# Patient Record
Sex: MALE | Race: WHITE | NOT HISPANIC OR LATINO | Employment: FULL TIME
[De-identification: names, ages, dates, MRNs, and addresses within clinical notes are randomized per-mention and may not be internally consistent; named-entity substitution may affect disease eponyms.]

---

## 2019-10-01 ENCOUNTER — HEALTH MAINTENANCE LETTER (OUTPATIENT)
Age: 34
End: 2019-10-01

## 2021-01-15 ENCOUNTER — HEALTH MAINTENANCE LETTER (OUTPATIENT)
Age: 36
End: 2021-01-15

## 2021-10-24 ENCOUNTER — HEALTH MAINTENANCE LETTER (OUTPATIENT)
Age: 36
End: 2021-10-24

## 2022-02-13 ENCOUNTER — HEALTH MAINTENANCE LETTER (OUTPATIENT)
Age: 37
End: 2022-02-13

## 2022-10-16 ENCOUNTER — HEALTH MAINTENANCE LETTER (OUTPATIENT)
Age: 37
End: 2022-10-16

## 2023-03-26 ENCOUNTER — HEALTH MAINTENANCE LETTER (OUTPATIENT)
Age: 38
End: 2023-03-26

## 2023-12-20 ENCOUNTER — OFFICE VISIT (OUTPATIENT)
Dept: INTERNAL MEDICINE | Facility: CLINIC | Age: 38
End: 2023-12-20
Payer: COMMERCIAL

## 2023-12-20 VITALS
SYSTOLIC BLOOD PRESSURE: 116 MMHG | OXYGEN SATURATION: 98 % | TEMPERATURE: 97.9 F | HEIGHT: 73 IN | DIASTOLIC BLOOD PRESSURE: 80 MMHG | BODY MASS INDEX: 35.39 KG/M2 | HEART RATE: 81 BPM | WEIGHT: 267 LBS | RESPIRATION RATE: 18 BRPM

## 2023-12-20 DIAGNOSIS — D68.51 HOMOZYGOUS FACTOR V LEIDEN MUTATION (H): ICD-10-CM

## 2023-12-20 DIAGNOSIS — Z13.220 SCREENING FOR HYPERLIPIDEMIA: ICD-10-CM

## 2023-12-20 DIAGNOSIS — I26.99 PE (PULMONARY THROMBOEMBOLISM) (H): ICD-10-CM

## 2023-12-20 DIAGNOSIS — B35.3 TINEA PEDIS OF RIGHT FOOT: ICD-10-CM

## 2023-12-20 DIAGNOSIS — Z11.59 NEED FOR HEPATITIS C SCREENING TEST: ICD-10-CM

## 2023-12-20 DIAGNOSIS — Z30.2 ENCOUNTER FOR VASECTOMY: ICD-10-CM

## 2023-12-20 DIAGNOSIS — Z13.29 SCREENING FOR THYROID DISORDER: ICD-10-CM

## 2023-12-20 DIAGNOSIS — Z79.01 WARFARIN ANTICOAGULATION: ICD-10-CM

## 2023-12-20 DIAGNOSIS — R07.9 CHEST PAIN, UNSPECIFIED TYPE: ICD-10-CM

## 2023-12-20 DIAGNOSIS — Z00.00 ANNUAL PHYSICAL EXAM: Primary | ICD-10-CM

## 2023-12-20 DIAGNOSIS — E66.812 CLASS 2 OBESITY WITH BODY MASS INDEX (BMI) OF 35.0 TO 35.9 IN ADULT, UNSPECIFIED OBESITY TYPE, UNSPECIFIED WHETHER SERIOUS COMORBIDITY PRESENT: ICD-10-CM

## 2023-12-20 LAB
BASOPHILS # BLD AUTO: 0 10E3/UL (ref 0–0.2)
BASOPHILS NFR BLD AUTO: 0 %
EOSINOPHIL # BLD AUTO: 0.1 10E3/UL (ref 0–0.7)
EOSINOPHIL NFR BLD AUTO: 3 %
ERYTHROCYTE [DISTWIDTH] IN BLOOD BY AUTOMATED COUNT: 12.9 % (ref 10–15)
HCT VFR BLD AUTO: 43 % (ref 40–53)
HGB BLD-MCNC: 14.9 G/DL (ref 13.3–17.7)
IMM GRANULOCYTES # BLD: 0 10E3/UL
IMM GRANULOCYTES NFR BLD: 0 %
INR PPP: 1.79 (ref 0.85–1.15)
LYMPHOCYTES # BLD AUTO: 1.7 10E3/UL (ref 0.8–5.3)
LYMPHOCYTES NFR BLD AUTO: 36 %
MCH RBC QN AUTO: 28 PG (ref 26.5–33)
MCHC RBC AUTO-ENTMCNC: 34.7 G/DL (ref 31.5–36.5)
MCV RBC AUTO: 81 FL (ref 78–100)
MONOCYTES # BLD AUTO: 0.4 10E3/UL (ref 0–1.3)
MONOCYTES NFR BLD AUTO: 9 %
NEUTROPHILS # BLD AUTO: 2.4 10E3/UL (ref 1.6–8.3)
NEUTROPHILS NFR BLD AUTO: 51 %
PLATELET # BLD AUTO: 173 10E3/UL (ref 150–450)
RBC # BLD AUTO: 5.32 10E6/UL (ref 4.4–5.9)
WBC # BLD AUTO: 4.6 10E3/UL (ref 4–11)

## 2023-12-20 PROCEDURE — 85610 PROTHROMBIN TIME: CPT | Performed by: INTERNAL MEDICINE

## 2023-12-20 PROCEDURE — 84443 ASSAY THYROID STIM HORMONE: CPT | Performed by: INTERNAL MEDICINE

## 2023-12-20 PROCEDURE — 85025 COMPLETE CBC W/AUTO DIFF WBC: CPT | Performed by: INTERNAL MEDICINE

## 2023-12-20 PROCEDURE — 93000 ELECTROCARDIOGRAM COMPLETE: CPT | Performed by: INTERNAL MEDICINE

## 2023-12-20 PROCEDURE — 86803 HEPATITIS C AB TEST: CPT | Performed by: INTERNAL MEDICINE

## 2023-12-20 PROCEDURE — 99385 PREV VISIT NEW AGE 18-39: CPT | Performed by: INTERNAL MEDICINE

## 2023-12-20 PROCEDURE — 36415 COLL VENOUS BLD VENIPUNCTURE: CPT | Performed by: INTERNAL MEDICINE

## 2023-12-20 PROCEDURE — 80061 LIPID PANEL: CPT | Performed by: INTERNAL MEDICINE

## 2023-12-20 PROCEDURE — 99214 OFFICE O/P EST MOD 30 MIN: CPT | Mod: 25 | Performed by: INTERNAL MEDICINE

## 2023-12-20 PROCEDURE — 80053 COMPREHEN METABOLIC PANEL: CPT | Performed by: INTERNAL MEDICINE

## 2023-12-20 RX ORDER — CLOTRIMAZOLE 1 %
CREAM (GRAM) TOPICAL 2 TIMES DAILY
Qty: 30 G | Refills: 1 | Status: SHIPPED | OUTPATIENT
Start: 2023-12-20

## 2023-12-20 RX ORDER — DEXTROAMPHETAMINE SACCHARATE, AMPHETAMINE ASPARTATE MONOHYDRATE, DEXTROAMPHETAMINE SULFATE AND AMPHETAMINE SULFATE 7.5; 7.5; 7.5; 7.5 MG/1; MG/1; MG/1; MG/1
CAPSULE, EXTENDED RELEASE ORAL
COMMUNITY
Start: 2023-11-03

## 2023-12-20 ASSESSMENT — ENCOUNTER SYMPTOMS
ABDOMINAL PAIN: 0
DIARRHEA: 0
DYSURIA: 0
FREQUENCY: 0
HEADACHES: 0
NERVOUS/ANXIOUS: 0
DIZZINESS: 0
EYE PAIN: 0
HEMATOCHEZIA: 0
JOINT SWELLING: 0
HEMATURIA: 0
COUGH: 0
HEARTBURN: 0
CHILLS: 0
WEAKNESS: 0
CONSTIPATION: 0
PARESTHESIAS: 0
SHORTNESS OF BREATH: 0
FEVER: 0
SORE THROAT: 0
ARTHRALGIAS: 0
MYALGIAS: 0
NAUSEA: 0
PALPITATIONS: 0

## 2023-12-20 NOTE — PROGRESS NOTES
SUBJECTIVE:   Rajesh is a 38 year old, presenting for the following:  Physical        Patient is a 38-year-old  male who presents to the clinic as a new patient for his annual physical examination.  He does have a history of ADHD as well as factor V Leiden mutation.  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  He is currently on chronic anticoagulation.  Patient does take warfarin 2.5 mg every day of the week, but he does state that every 2 weeks he will take 3 days of 5 mg.  His last INR was in August at an outside clinic.  Patient states that his goal INR has been 2-3.  Patient does also have concerns about intermittent chest pains that he has been having for the past several years.  He states that they will occur randomly and last for a few minutes.  He describes them as a pressure-like sensation.  Patient does not report any diaphoresis, palpitations, or difficulties breathing these episodes do occur.  He does report a family history of coronary disease as his mother did have a MI in her early 50s.  Patient would like further evaluation of this issue at this time.  He is also requesting referral for vasectomy.  Patient is concerned about his weight as well.  He does state that he has 2 small children at home, and he has not been able to be as active as he has been in the past.  Patient is interested in meeting with the [place] weight management program.  Patient is fasting for lab work today.  His only other concerns in regards to a persistent rash located between his third, fourth, and fifth toes on his right foot.  Patient reports there is always an area of redness that is wet with peeling skin.  This area will itch frequently as well.  He is wondering what he can do for this issue at this time.    Healthy Habits:     Getting at least 3 servings of Calcium per day:  Yes    Bi-annual eye exam:  NO    Dental care twice a year:  Yes    Sleep apnea or symptoms of sleep apnea:  None    " Diet:  Regular (no restrictions)    Frequency of exercise:  None    Taking medications regularly:  Yes    Medication side effects:  None    Additional concerns today:  Yes      Today's PHQ-2 Score:       12/20/2023     7:57 AM   PHQ-2 ( 1999 [employer])   Q1: Little interest or pleasure in doing things 0   Q2: Feeling down, depressed or hopeless 0   PHQ-2 Score 0   Q1: Little interest or pleasure in doing things Not at all   Q2: Feeling down, depressed or hopeless Not at all   PHQ-2 Score 0       Have you ever done Advance Care Planning? (For example, a Health Directive, POLST, or a discussion with a medical provider or your loved ones about your wishes): No, advance care planning information given to patient to review.  Patient declined advance care planning discussion at this time.    Social History     Tobacco Use    Smoking status: Never    Smokeless tobacco: Never   Substance Use Topics    Alcohol use: Yes     Comment: occ           12/20/2023     7:57 AM   Alcohol Use   Prescreen: >3 drinks/day or >7 drinks/week? No       Last PSA: No results found for: \"PSA\"    Reviewed orders with patient. Reviewed health maintenance and updated orders accordingly - Yes  Lab work is in process    Reviewed and updated as needed this visit by clinical staff   Tobacco  Allergies  Meds  Problems  Med Hx  Surg Hx  Fam Hx          Reviewed and updated as needed this visit by Provider   Tobacco  Allergies  Meds  Problems  Med Hx  Surg Hx  Fam Hx           Review of Systems   Constitutional:  Negative for chills and fever.   HENT:  Negative for congestion, ear pain, hearing loss and sore throat.    Eyes:  Negative for pain and visual disturbance.   Respiratory:  Negative for cough and shortness of breath.    Cardiovascular:  Positive for chest pain. Negative for palpitations and peripheral edema.   Gastrointestinal:  Negative for abdominal pain, constipation, diarrhea, heartburn, hematochezia and nausea.   Genitourinary:  " "Negative for dysuria, frequency, genital sores, hematuria, impotence, penile discharge and urgency.   Musculoskeletal:  Negative for arthralgias, joint swelling and myalgias.   Skin:  Positive for rash.   Neurological:  Negative for dizziness, weakness, headaches and paresthesias.   Psychiatric/Behavioral:  Negative for mood changes. The patient is not nervous/anxious.        OBJECTIVE:   /80   Pulse 81   Temp 97.9  F (36.6  C)   Resp 18   Ht 1.854 m (6' 1\")   Wt 121.1 kg (267 lb)   SpO2 98%   BMI 35.23 kg/m      Physical Exam  Vitals reviewed.   HENT:      Head: Normocephalic and atraumatic.      Right Ear: Tympanic membrane, ear canal and external ear normal.      Left Ear: Tympanic membrane, ear canal and external ear normal.      Mouth/Throat:      Mouth: Mucous membranes are moist.      Pharynx: Oropharynx is clear.   Eyes:      Extraocular Movements: Extraocular movements intact.      Conjunctiva/sclera: Conjunctivae normal.      Pupils: Pupils are equal, round, and reactive to light.   Cardiovascular:      Rate and Rhythm: Normal rate and regular rhythm.      Pulses: Normal pulses.      Heart sounds: Normal heart sounds.   Pulmonary:      Effort: Pulmonary effort is normal.      Breath sounds: Normal breath sounds.   Abdominal:      General: Bowel sounds are normal.      Palpations: Abdomen is soft.   Musculoskeletal:         General: Normal range of motion.      Cervical back: Normal range of motion and neck supple.   Skin:     General: Skin is warm and dry.      Capillary Refill: Capillary refill takes less than 2 seconds.      Comments: Area of erythematous, peeling skin noted in the intertrigo in the spaces of his third, fourth, and fifth digits on his right foot.   Neurological:      General: No focal deficit present.      Mental Status: He is alert and oriented to person, place, and time.     Diagnostic testing: CMP, CBC, FLP, TSH, and hepatitis C screening are pending.  EKG is " pending.    ASSESSMENT/PLAN:   (Z00.00) Annual physical exam  (primary encounter diagnosis)  Comment: At this time, patient does have a relatively unremarkable physical examination.  His blood pressure was noted to be at an acceptable level.  We did visit discussing appropriate dietary lifestyle modifications to help keep his weight and blood pressure under better control.  Fasting labs are pending.  All health maintenance items were addressed.    (Z11.59) Need for hepatitis C screening test  Comment: Hepatitis C Screen Reflex to HCV RNA Quant and         Genotype    (I26.99) PE (pulmonary thromboembolism) (H), (D68.51) Homozygous Factor V Leiden mutation (H24), and (Z79.01) Warfarin anticoagulation  Comment: At this time, a referral to the [facility] clinic will be placed for continued management of his anticoagulation given his history of homozygous factor V Leiden mutation and recurrent clots.  He does have a follow-up INR pending.  We did briefly discuss the possibility of transitioning to an alternative anticoagulant, such as Xarelto or Eliquis.  Patient states that he is interested in those medications, but he would like to discuss them with his insurance company first.    (Z13.220) Screening for hyperlipidemia  Comment: Lipid panel reflex to direct LDL Fasting    (Z13.29) Screening for thyroid disorder  Comment: TSH with free T4 reflex    (B35.3) Tinea pedis of right foot  Comment: Patient appears to have a case of tinea pedis involving the toes on his right foot.  I will submit a prescription for clotrimazole cream to be applied to the affected area twice per day.  Patient was encouraged to try to keep the areas clean and dry as possible.    (Z30.2) Encounter for vasectomy  Comment: Adult Urology  Referral    (R07.9) Chest pain, unspecified type  Comment: At this time, we did elect to proceed with further evaluation of his intermittent chest pain that has been present for the past several years.  An EKG is  currently pending.  He also has a CBC to evaluate for any anemia that could be contribute to his symptoms.  Patient also has a CMP and FLP that are pending.  It may be worthwhile to consider stress testing if his labs and EKG are unremarkable given his family history of coronary artery disease.    (E66.9,  Z68.35) Class 2 obesity with body mass index (BMI) of 35.0 to 35.9 in adult, unspecified obesity type, unspecified whether serious comorbidity present  Comment: Patient was noted to have a BMI of 35.2.  We did discuss weight management options, and patient was ultimately agreeable to proceed with the [place] weight management program.  A referral was placed for him today.    Patient has been advised of split billing requirements and indicates understanding: Yes      COUNSELING:   Reviewed preventive health counseling, as reflected in patient instructions       Regular exercise       Healthy diet/nutrition        He reports that he has never smoked. He has never used smokeless tobacco.      Ash Diaz MD  [facility]

## 2023-12-21 ENCOUNTER — ANTICOAGULATION THERAPY VISIT (OUTPATIENT)
Dept: ANTICOAGULATION | Facility: CLINIC | Age: 38
End: 2023-12-21
Payer: COMMERCIAL

## 2023-12-21 ENCOUNTER — TELEPHONE (OUTPATIENT)
Dept: ANTICOAGULATION | Facility: CLINIC | Age: 38
End: 2023-12-21
Payer: COMMERCIAL

## 2023-12-21 ENCOUNTER — TELEPHONE (OUTPATIENT)
Dept: INTERNAL MEDICINE | Facility: CLINIC | Age: 38
End: 2023-12-21
Payer: COMMERCIAL

## 2023-12-21 DIAGNOSIS — D68.51 HOMOZYGOUS FACTOR V LEIDEN MUTATION (H): Primary | ICD-10-CM

## 2023-12-21 DIAGNOSIS — I26.99 PE (PULMONARY THROMBOEMBOLISM) (H): ICD-10-CM

## 2023-12-21 DIAGNOSIS — I82.401 DEEP VEIN THROMBOSIS (DVT) OF RIGHT LOWER EXTREMITY, UNSPECIFIED CHRONICITY, UNSPECIFIED VEIN (H): ICD-10-CM

## 2023-12-21 DIAGNOSIS — Z79.01 WARFARIN ANTICOAGULATION: ICD-10-CM

## 2023-12-21 LAB
ALBUMIN SERPL BCG-MCNC: 4.2 G/DL (ref 3.5–5.2)
ALP SERPL-CCNC: 78 U/L (ref 40–150)
ALT SERPL W P-5'-P-CCNC: 38 U/L (ref 0–70)
ANION GAP SERPL CALCULATED.3IONS-SCNC: 8 MMOL/L (ref 7–15)
AST SERPL W P-5'-P-CCNC: 30 U/L (ref 0–45)
BILIRUB SERPL-MCNC: 0.4 MG/DL
BUN SERPL-MCNC: 14.1 MG/DL (ref 6–20)
CALCIUM SERPL-MCNC: 9.2 MG/DL (ref 8.6–10)
CHLORIDE SERPL-SCNC: 104 MMOL/L (ref 98–107)
CHOLEST SERPL-MCNC: 158 MG/DL
CREAT SERPL-MCNC: 1.11 MG/DL (ref 0.67–1.17)
DEPRECATED HCO3 PLAS-SCNC: 28 MMOL/L (ref 22–29)
EGFRCR SERPLBLD CKD-EPI 2021: 88 ML/MIN/1.73M2
FASTING STATUS PATIENT QL REPORTED: YES
GLUCOSE SERPL-MCNC: 84 MG/DL (ref 70–99)
HCV AB SERPL QL IA: NONREACTIVE
HDLC SERPL-MCNC: 47 MG/DL
LDLC SERPL CALC-MCNC: 100 MG/DL
NONHDLC SERPL-MCNC: 111 MG/DL
POTASSIUM SERPL-SCNC: 4.6 MMOL/L (ref 3.4–5.3)
PROT SERPL-MCNC: 7.2 G/DL (ref 6.4–8.3)
SODIUM SERPL-SCNC: 140 MMOL/L (ref 135–145)
TRIGL SERPL-MCNC: 53 MG/DL
TSH SERPL DL<=0.005 MIU/L-ACNC: 2.1 UIU/ML (ref 0.3–4.2)

## 2023-12-21 RX ORDER — WARFARIN SODIUM 2.5 MG/1
TABLET ORAL
Qty: 108 TABLET | Refills: 0 | Status: SHIPPED | OUTPATIENT
Start: 2023-12-21

## 2023-12-21 NOTE — TELEPHONE ENCOUNTER
"ANTICOAGULATION  MANAGEMENT: NEW REFERRAL      SUBJECTIVE/OBJECTIVE     Rajesh Ordaz, a 38 year old male  is newly referred to [facility].    Anticoagulation:    Previously on warfarin: Yes,  has been on for about 10 years.  Approximate previous dose: Take 2.5 mg daily and on every fifth day take 5 mg or as directed  Warfarin initiation date (approximate): about 10 years   Indication(s): DVT, PE, and Homozygous Factor V Leiden   Goal Range: 2.0-3.0   Anticoagulation Bridge/Overlap: No   Referring provider: from PCP    General Dietary/Social Hx:    Typical vitamin K intake: low to moderate variable     Other dietary considerations: None     Social History:   Social History     Tobacco Use    Smoking status: Never    Smokeless tobacco: Never   Substance Use Topics    Alcohol use: Yes     Comment: occ    Drug use: No     Patient reports alcohol use varies, does not have very often,   In the past 2 weeks, patient estimates taking medications as instructed % of time: 100    Results:        Recent labs: (last 7 days)     12/20/23  0913   INR 1.79*       Wt Readings from Last 2 Encounters:   12/20/23 121.1 kg (267 lb)   01/15/14 119.3 kg (263 lb 0.1 oz)      Estimated body mass index is 35.23 kg/m  as calculated from the following:    Height as of 12/20/23: 1.854 m (6' 1\").    Weight as of 12/20/23: 121.1 kg (267 lb).  Lab Results   Component Value Date    AST 30 12/20/2023    ALT 38 12/20/2023    ALBUMIN 4.2 12/20/2023     Lab Results   Component Value Date    CR 1.11 12/20/2023     Estimated Creatinine Clearance: 125.4 mL/min (based on SCr of 1.11 mg/dL).    ASSESSMENT     Goal INR 2-3, standard for indication(s) above  On warfarin > 30 days; maintenance dose has been established  Maintenance dose established prior to referral    PLAN     Dosing Instructions:  Take 2.5 mg daily and on every fifth day take 5 mg or as directed was previous dosing, New Rx for 2.5 mg tablets will be sent to " pharmacy so patient will have a more consistent dosing of 3.75 mg every Mon, Wed, Fri; 2.5 mg all other days  with INR in 1 week, however patient requested 9 days           Education provided:   Please call back if any changes to your diet, medications or how you've been taking warfarin  Dietary considerations: importance of consistent vitamin K intake  Symptom monitoring: monitoring for bleeding signs and symptoms, monitoring for clotting signs and symptoms, and if you hit your head or have a bad fall seek emergency care  Importance of notifying anticoagulation clinic for: changes in medications; a sooner lab recheck maybe needed, diarrhea, nausea/vomiting, reduced intake, cold/flu, and/or infections; a sooner lab recheck maybe needed, and upcoming surgeries and procedures 2 weeks in advance  Contact [phone]  with any changes, questions or concerns.   New patient packet placed in outgoing mail so patient has our information    Education still needed:   Please call back if any changes to your diet, medications or how you've been taking warfarin  Contact [phone]  with any changes, questions or concerns.       Telephone call with Rajesh who verbalizes understanding and agrees to plan    Lab visit scheduled    Standing orders placed in Epic: Point of Care INR (Lab 5000)    Plan made per [facility] anticoagulation protocol    Yvonne Ramires RN  Anticoagulation Clinic  12/21/2023

## 2023-12-21 NOTE — PROGRESS NOTES
ANTICOAGULATION MANAGEMENT     Rajesh Ordaz 38 year old male is on warfarin with subtherapeutic INR result. (Goal INR 2.0-3.0)    Recent labs: (last 7 days)     12/20/23  0913   INR 1.79*       ASSESSMENT     Source(s): Chart Review and Patient/Caregiver Call     Warfarin doses taken: Warfarin taken as instructed Previous warfarin dosing was 2.5 mg daily and on every fifth day take 5 mg Will send in a new Rx for 2.5 mg tablets so patient will have a more consistent dosing of 3.75 mg every Mon, Wed, Fri; 2.5 mg all other days  Diet: No new diet changes identified, working on consistency in green veggies  Medication/supplement changes: None noted  New illness, injury, or hospitalization: No  Signs or symptoms of bleeding or clotting: No  Previous result:  First INR with [facility] 12/20/23  Additional findings: Refill needed today. Rajesh meets all criteria for refill (current [facility] referral, office visit with referring provider/group in last 1 year unless directed to return in 2 years in last referring provider visit note, lab monitoring up to date or not exceeding 2 weeks overdue). Rx instructions and quantity supplied updated to match patient's current dosing plan.  90 day supply with 0 refills granted per [facility] protocol        PLAN     Recommended plan for no diet, medication or health factor changes affecting INR     Dosing Instructions: booster dose then continue your current warfarin dose as discussed today with next INR in 9 days per patient request       Summary  As of 12/21/2023      Full warfarin instructions:  3.75 mg every Mon, Wed, Fri; 2.5 mg all other days   Next INR check:  12/29/2023               Telephone call with Rajesh who verbalizes understanding and agrees to plan    Lab visit scheduled    Education provided:   Please call back if any changes to your diet, medications or how you've been taking warfarin  Contact [phone]  with any changes, questions or concerns.     Plan made per [facility]  anticoagulation protocol    Yvonne Ramires RN  Anticoagulation Clinic  12/21/2023    _______________________________________________________________________     Anticoagulation Episode Summary       Current INR goal:  2.0-3.0   TTR:  --   Target end date:  Indefinite   Send INR reminders to:  [facility]    Indications    PE (pulmonary thromboembolism) (H) [I26.99]  Homozygous Factor V Leiden mutation (H24) [D68.51]  Warfarin anticoagulation [Z79.01]             Comments:               Anticoagulation Care Providers       Provider Role Specialty Phone number    Ash Diaz MD Referring Internal Medicine [phone]

## 2023-12-21 NOTE — TELEPHONE ENCOUNTER
[employer] screening form completed and faxed to [phone] and original mailed to patient . Copy in chart

## 2023-12-29 ENCOUNTER — LAB (OUTPATIENT)
Dept: LAB | Facility: CLINIC | Age: 38
End: 2023-12-29
Payer: COMMERCIAL

## 2023-12-29 ENCOUNTER — ANTICOAGULATION THERAPY VISIT (OUTPATIENT)
Dept: ANTICOAGULATION | Facility: CLINIC | Age: 38
End: 2023-12-29

## 2023-12-29 DIAGNOSIS — I82.401 DEEP VEIN THROMBOSIS (DVT) OF RIGHT LOWER EXTREMITY, UNSPECIFIED CHRONICITY, UNSPECIFIED VEIN (H): ICD-10-CM

## 2023-12-29 DIAGNOSIS — I26.99 PE (PULMONARY THROMBOEMBOLISM) (H): Primary | ICD-10-CM

## 2023-12-29 DIAGNOSIS — I26.99 PE (PULMONARY THROMBOEMBOLISM) (H): ICD-10-CM

## 2023-12-29 DIAGNOSIS — D68.51 HOMOZYGOUS FACTOR V LEIDEN MUTATION (H): ICD-10-CM

## 2023-12-29 DIAGNOSIS — Z79.01 WARFARIN ANTICOAGULATION: ICD-10-CM

## 2023-12-29 LAB — INR BLD: 2.9 (ref 0.9–1.1)

## 2023-12-29 PROCEDURE — 85610 PROTHROMBIN TIME: CPT

## 2023-12-29 PROCEDURE — 36416 COLLJ CAPILLARY BLOOD SPEC: CPT

## 2023-12-29 NOTE — PROGRESS NOTES
ANTICOAGULATION MANAGEMENT     Rajesh Ordaz 38 year old male is on warfarin with therapeutic INR result. (Goal INR 2.0-3.0)    Recent labs: (last 7 days)     12/29/23  1140   INR 2.9*       ASSESSMENT     Warfarin Lab Questionnaire    Warfarin Doses Last 7 Days      12/29/2023    11:35 AM   Dose in Tablet or Mg   TAB or MG? tablet (tab)     Pt Rptd Dose SUNDAY MONDAY TUESDAY WED THURS FRIDAY SATURDAY 12/29/2023  11:35 AM 1 1.5 1 1.5 1 1.5 1         12/29/2023   Warfarin Lab Questionnaire   Missed doses within past 14 days? Yes   If yes; please list when: Missed pills on 12/24 & 12/25 but took Tues morning   Changes in diet or alcohol within past 14 days? No   Medication changes since last result? No   Injuries or illness since last result? No   New shortness of breath, severe headaches or sudden changes in vision since last result? No   Abnormal bleeding since last result? No   Upcoming surgery, procedure? No   Best number to call with results? [phone]     Previous result: Subtherapeutic  Additional findings: None       PLAN     Recommended plan for temporary change(s) affecting INR     Dosing Instructions: Continue your current warfarin dose with next INR in 2 weeks       Summary  As of 12/29/2023      Full warfarin instructions:  3.75 mg every Mon, Wed, Fri; 2.5 mg all other days   Next INR check:  1/12/2024               Telephone call with Rajesh who verbalizes understanding and agrees to plan and who agrees to plan and repeated back plan correctly    Lab visit scheduled    Education provided:   Informed patient that he made up for the missed warfarin doses appropriately.    Plan made per [facility] anticoagulation protocol    Hermila Gore, RN  Anticoagulation Clinic  12/29/2023    _______________________________________________________________________     Anticoagulation Episode Summary       Current INR goal:  2.0-3.0   TTR:  --   Target end date:  Indefinite   Send INR reminders to:  [facility]ON[facility]LS[facility]  [facility]    Indications    PE (pulmonary thromboembolism) (H) [I26.99]  Homozygous Factor V Leiden mutation (H24) [D68.51]  Warfarin anticoagulation [Z79.01]             Comments:               Anticoagulation Care Providers       Provider Role Specialty Phone number    Ash Diaz MD Referring Internal Medicine [phone]

## 2024-01-12 ENCOUNTER — ANTICOAGULATION THERAPY VISIT (OUTPATIENT)
Dept: ANTICOAGULATION | Facility: CLINIC | Age: 39
End: 2024-01-12

## 2024-01-12 ENCOUNTER — LAB (OUTPATIENT)
Dept: LAB | Facility: CLINIC | Age: 39
End: 2024-01-12
Payer: COMMERCIAL

## 2024-01-12 DIAGNOSIS — D68.51 HOMOZYGOUS FACTOR V LEIDEN MUTATION (H): ICD-10-CM

## 2024-01-12 DIAGNOSIS — I82.401 DEEP VEIN THROMBOSIS (DVT) OF RIGHT LOWER EXTREMITY, UNSPECIFIED CHRONICITY, UNSPECIFIED VEIN (H): ICD-10-CM

## 2024-01-12 DIAGNOSIS — I26.99 PE (PULMONARY THROMBOEMBOLISM) (H): Primary | ICD-10-CM

## 2024-01-12 DIAGNOSIS — Z79.01 WARFARIN ANTICOAGULATION: ICD-10-CM

## 2024-01-12 DIAGNOSIS — I26.99 PE (PULMONARY THROMBOEMBOLISM) (H): ICD-10-CM

## 2024-01-12 LAB — INR BLD: 2.3 (ref 0.9–1.1)

## 2024-01-12 PROCEDURE — 85610 PROTHROMBIN TIME: CPT

## 2024-01-12 PROCEDURE — 36416 COLLJ CAPILLARY BLOOD SPEC: CPT

## 2024-01-12 NOTE — PROGRESS NOTES
ANTICOAGULATION MANAGEMENT     Rajesh Ordaz 38 year old male is on warfarin with therapeutic INR result. (Goal INR 2.0-3.0)    Recent labs: (last 7 days)     01/12/24  1145   INR 2.3*       ASSESSMENT     Warfarin Lab Questionnaire    Warfarin Doses Last 7 Days      1/12/2024    11:41 AM   Dose in Tablet or Mg   TAB or MG? tablet (tab)     Pt Rptd Dose SUNDAY MONDAY TUESDAY WED THURS FRIDAY SATURDAY 1/12/2024  11:41 AM 1 1.5 1 1.5 1 1.5 1         1/12/2024   Warfarin Lab Questionnaire   Missed doses within past 14 days? Yes   If yes; please list when: took tues dose weds morning   Changes in diet or alcohol within past 14 days? No, patient reports he is not consistent with vitamin K intake but plans to improve this. He reports 3-4 servings in the past week (about 3 salads and broccoli last night).   Medication changes since last result? No   Injuries or illness since last result? No   New shortness of breath, severe headaches or sudden changes in vision since last result? No   Abnormal bleeding since last result? No   Upcoming surgery, procedure? No   Best number to call with results? [phone]     Previous result: Therapeutic last visit; previously outside of goal range  Additional findings: patient reports he is still using his 5mg tablets on his 2.5mg days, bottle is dated 08/2023, calendar updated.       PLAN     Recommended plan for temporary change(s) affecting INR     Dosing Instructions: Continue your current warfarin dose with next INR in 3 weeks       Summary  As of 1/12/2024      Full warfarin instructions:  3.75 mg every Mon, Wed, Fri; 2.5 mg all other days   Next INR check:  2/2/2024               Telephone call with Rajesh who verbalizes understanding and agrees to plan and who agrees to plan and repeated back plan correctly    Lab visit scheduled    Education provided:   Taking warfarin: prescribed tablet strength and color and Importance of taking warfarin as instructed  Dietary  considerations: importance of consistent vitamin K intake, impact of vitamin K foods on INR, and vitamin K content of foods  Vitamin K food list sent to My Chart per patient request.    Plan made per [facility] anticoagulation protocol    Hermila Gore RN  Anticoagulation Clinic  1/12/2024    _______________________________________________________________________     Anticoagulation Episode Summary       Current INR goal:  2.0-3.0   TTR:  100.0% (1.9 wk)   Target end date:  Indefinite   Send INR reminders to:  [facility]    Indications    PE (pulmonary thromboembolism) (H) [I26.99]  Homozygous Factor V Leiden mutation (H24) [D68.51]  Warfarin anticoagulation [Z79.01]             Comments:               Anticoagulation Care Providers       Provider Role Specialty Phone number    Ash Diaz MD Referring Internal Medicine [phone]

## 2024-01-19 ENCOUNTER — MYC MEDICAL ADVICE (OUTPATIENT)
Dept: UROLOGY | Facility: CLINIC | Age: 39
End: 2024-01-19
Payer: COMMERCIAL

## 2024-01-24 ENCOUNTER — VIRTUAL VISIT (OUTPATIENT)
Dept: SURGERY | Facility: CLINIC | Age: 39
End: 2024-01-24
Payer: COMMERCIAL

## 2024-01-24 DIAGNOSIS — Z71.3 NUTRITIONAL COUNSELING: Primary | ICD-10-CM

## 2024-01-24 DIAGNOSIS — E66.812 CLASS 2 OBESITY WITH BODY MASS INDEX (BMI) OF 35.0 TO 35.9 IN ADULT, UNSPECIFIED OBESITY TYPE, UNSPECIFIED WHETHER SERIOUS COMORBIDITY PRESENT: ICD-10-CM

## 2024-01-24 PROCEDURE — 97802 MEDICAL NUTRITION INDIV IN: CPT | Mod: 95 | Performed by: DIETITIAN, REGISTERED

## 2024-01-24 NOTE — PROGRESS NOTES
"Rajesh Ordaz is a 38 year old who is being evaluated via a billable video visit.      How would you like to obtain your AVS? MyChart  If the video visit is dropped, the invitation should be resent by: Send to e-mail at: [email or web address]  Will anyone else be joining your video visit? No          Medical Weight Loss Initial Diet Evaluation  Assessment:  Rajesh is presenting today for a new weight management nutrition consultation.     Weight loss medication:  None .     Anthropometrics:    Pt's weight is 265 lbs   Initial weight: 265 lbs   Weight change: 0  BMI: There is no height or weight on file to calculate BMI.   Ideal body weight: 79.9 kg (176 lb 2.4 oz)  Adjusted ideal body weight: 96.4 kg (212 lb 7.8 oz)    Estimated RMR (Vilas-St Jeor equation):  2189 kcals x 1.3 (light active) = 2846 kcals (for weight maintenance)    Recommended Protein Intake: 80-10 grams of protein/day    Medical History:  Patient Active Problem List   Diagnosis    Allergic rhinitis due to other allergen    PE (pulmonary thromboembolism) (H)    DVT (deep venous thrombosis) (H)    Hyperglycemia    Obesity    Infarct of lung (H)    Iron deficiency anemia    Homozygous Factor V Leiden mutation (H24)    Warfarin anticoagulation      Diabetes: No   HbA1c:  No results found for: \"HGBA1C\"    Nutrition History:   Food allergies/intolerances/cultural or religous food customs: No     Vitamins/Mineral Supplementation: Nothing Currently     Dietary Recall:  Breakfast: bowl of cereal with milk or Nicola Bar or Sausage and Egg Biscuit or overnight oats  Lunch: occasional may eat out or turkey BBQ sauce and cheddar sandwich, apple, chips or instant Turkish Food (from a packet) or skipped   Dinner: take out or Beef Stew with 1 slice of toast or tacos or sloppy joes with a salad or vegetable or leftovers from a previous meal or egg, cheese, and sausage sandwich   Typical Snacks: needs to improve upon, way too much snacking this time of the " year-cookies, candy, cashews or pistachios, popcorn or chips, crackers with PB   Overnight eating: No  Eating out: 2-3 times/week (sometimes more)     Beverages: milk 1-2 times/day, water, occasional pop or mixed drink (a few per week)     Exercise: no set regimen    Nutrition Diagnosis (PES statement):     Obesity related to excessive energy intake as evidence by subjective statements and BMI of 35 kg/m2.        Nutrition Intervention  Food and/or Nutrient Delivery   Placed emphasis on importance of developing a healthy meal routine, aiming for 3 meals a day and no snacks.    Nutrition Education   Discussed with patient how to build a meal: the importance of including a lean/low fat protein at each meal, include a source of vegetables at a minimum of lunch and dinner and limiting carbohydrate intake to <25% per meal.  Educated on sources of lean protein, portion sizes, the amount of grams found in each source. Recommend patient to aim for 20-30g protein at each meal.  Educated on how to read a food label: keeping total fat <10g and sugar <10g per serving.  Discussed the importance of adequate hydration, with emphasis on drinking 64oz of water or zero calorie beverages per day.    Nutrition Counseling   Encouraged importance of developing routine exercise for health benefits and weight loss.      Goals established by patient:   Focus on protein first at each meal, aiming for 20-30 grams of protein/meal, 3 meals/day.   Work on meal planning/prepping ahead of time to help reduce eating out frequency. Use list in [employer] to help with suggestions for meals and snacks.     Handouts provided:  Calorie Controlled Meals  Quick and Easy Meals   Snack Ideas    Assessment/Plan:    Pt will follow up in prn with dietitian.     Video-Visit Details    Type of service:  Video Visit    Video Start Time (time video started): 1:20 PM    Video End Time (time video stopped): 2:07 PM    Originating Location (pt. Location):  Home      Distant Location (provider location):  Off-site    Mode of Communication:  Video Conference via [facility]    Physician has received verbal consent for a Video Visit from the patient? Yes      Kayleen Borrego, RD

## 2024-01-24 NOTE — LETTER
"    1/24/2024         RE: Rajesh Ordaz  [address] [facility] [address]        Dear Colleague,    Thank you for referring your patient, Rajesh Ordaz, to the [facility]. Please see a copy of my visit note below.    Rajesh Ordaz is a 38 year old who is being evaluated via a billable video visit.      How would you like to obtain your AVS? MyChart  If the video visit is dropped, the invitation should be resent by: Send to e-mail at: [email or web address]  Will anyone else be joining your video visit? No          Medical Weight Loss Initial Diet Evaluation  Assessment:  Rajesh is presenting today for a new weight management nutrition consultation.     Weight loss medication:  None .     Anthropometrics:    Pt's weight is 265 lbs   Initial weight: 265 lbs   Weight change: 0  BMI: There is no height or weight on file to calculate BMI.   Ideal body weight: 79.9 kg (176 lb 2.4 oz)  Adjusted ideal body weight: 96.4 kg (212 lb 7.8 oz)    Estimated RMR ([place]-St Jeor equation):  2189 kcals x 1.3 (light active) = 2846 kcals (for weight maintenance)    Recommended Protein Intake: 80-10 grams of protein/day    Medical History:  Patient Active Problem List   Diagnosis     Allergic rhinitis due to other allergen     PE (pulmonary thromboembolism) (H)     DVT (deep venous thrombosis) (H)     Hyperglycemia     Obesity     Infarct of lung (H)     Iron deficiency anemia     Homozygous Factor V Leiden mutation (H24)     Warfarin anticoagulation      Diabetes: No   HbA1c:  No results found for: \"HGBA1C\"    Nutrition History:   Food allergies/intolerances/cultural or religous food customs: No     Vitamins/Mineral Supplementation: Nothing Currently     Dietary Recall:  Breakfast: bowl of cereal with milk or Nicola Bar or Sausage and Egg Biscuit or overnight oats  Lunch: occasional may eat out or turkey BBQ sauce and cheddar sandwich, apple, chips or instant Citizen of [place] Food (from a " packet) or skipped   Dinner: take out or Beef Stew with 1 slice of toast or tacos or sloppy joes with a salad or vegetable or leftovers from a previous meal or egg, cheese, and sausage sandwich   Typical Snacks: needs to improve upon, way too much snacking this time of the year-cookies, candy, cashews or pistachios, popcorn or chips, crackers with PB   Overnight eating: No  Eating out: 2-3 times/week (sometimes more)     Beverages: milk 1-2 times/day, water, occasional pop or mixed drink (a few per week)     Exercise: no set regimen    Nutrition Diagnosis (PES statement):     Obesity related to excessive energy intake as evidence by subjective statements and BMI of 35 kg/m2.        Nutrition Intervention  Food and/or Nutrient Delivery   Placed emphasis on importance of developing a healthy meal routine, aiming for 3 meals a day and no snacks.    Nutrition Education   Discussed with patient how to build a meal: the importance of including a lean/low fat protein at each meal, include a source of vegetables at a minimum of lunch and dinner and limiting carbohydrate intake to <25% per meal.  Educated on sources of lean protein, portion sizes, the amount of grams found in each source. Recommend patient to aim for 20-30g protein at each meal.  Educated on how to read a food label: keeping total fat <10g and sugar <10g per serving.  Discussed the importance of adequate hydration, with emphasis on drinking 64oz of water or zero calorie beverages per day.    Nutrition Counseling   Encouraged importance of developing routine exercise for health benefits and weight loss.      Goals established by patient:   Focus on protein first at each meal, aiming for 20-30 grams of protein/meal, 3 meals/day.   Work on meal planning/prepping ahead of time to help reduce eating out frequency. Use list in [employer] to help with suggestions for meals and snacks.     Handouts provided:  Calorie Controlled Meals  Quick and Easy Meals   Snack  Ideas    Assessment/Plan:    Pt will follow up in prn with dietitian.     Video-Visit Details    Type of service:  Video Visit    Video Start Time (time video started): 1:20 PM    Video End Time (time video stopped): 2:07 PM    Originating Location (pt. Location): Home      Distant Location (provider location):  Off-site    Mode of Communication:  Video Conference via [facility]    Physician has received verbal consent for a Video Visit from the patient? Yes      Kayleen Borrego RD        Again, thank you for allowing me to participate in the care of your patient.        Sincerely,        Kayleen Borrego RD

## 2024-02-02 ENCOUNTER — LAB (OUTPATIENT)
Dept: LAB | Facility: CLINIC | Age: 39
End: 2024-02-02
Payer: COMMERCIAL

## 2024-02-02 ENCOUNTER — ANTICOAGULATION THERAPY VISIT (OUTPATIENT)
Dept: ANTICOAGULATION | Facility: CLINIC | Age: 39
End: 2024-02-02

## 2024-02-02 DIAGNOSIS — Z79.01 WARFARIN ANTICOAGULATION: ICD-10-CM

## 2024-02-02 DIAGNOSIS — D68.51 HOMOZYGOUS FACTOR V LEIDEN MUTATION (H): ICD-10-CM

## 2024-02-02 DIAGNOSIS — I26.99 PE (PULMONARY THROMBOEMBOLISM) (H): Primary | ICD-10-CM

## 2024-02-02 DIAGNOSIS — I82.401 DEEP VEIN THROMBOSIS (DVT) OF RIGHT LOWER EXTREMITY, UNSPECIFIED CHRONICITY, UNSPECIFIED VEIN (H): ICD-10-CM

## 2024-02-02 DIAGNOSIS — I26.99 PE (PULMONARY THROMBOEMBOLISM) (H): ICD-10-CM

## 2024-02-02 LAB — INR BLD: 2.1 (ref 0.9–1.1)

## 2024-02-02 PROCEDURE — 36416 COLLJ CAPILLARY BLOOD SPEC: CPT

## 2024-02-02 PROCEDURE — 85610 PROTHROMBIN TIME: CPT

## 2024-02-02 NOTE — PROGRESS NOTES
ANTICOAGULATION MANAGEMENT     Rajesh Ordaz 38 year old male is on warfarin with therapeutic INR result. (Goal INR 2.0-3.0)    Recent labs: (last 7 days)     02/02/24  1144   INR 2.1*       ASSESSMENT     Warfarin Lab Questionnaire    Warfarin Doses Last 7 Days      2/2/2024    11:48 AM   Dose in Tablet or Mg   TAB or MG? tablet (tab)     Pt Rptd Dose SUNDAY MONDAY TUESDAY WED THURS FRIDAY SATURDAY 2/2/2024  11:48 AM 1 1.5 1 1.5 1 1.5 1         2/2/2024   Warfarin Lab Questionnaire   Missed doses within past 14 days? No   Changes in diet or alcohol within past 14 days? No   Medication changes since last result? Yes   Please list: started taking vitamin D3.   No interaction per Uptodate.   Injuries or illness since last result? No   New shortness of breath, severe headaches or sudden changes in vision since last result? No   Abnormal bleeding since last result? No   Upcoming surgery, procedure? No   Best number to call with results? [phone]     Previous result: Therapeutic last 2(+) visits  Additional findings: None       PLAN     Recommended plan for no diet, medication or health factor changes affecting INR     Dosing Instructions: Continue your current warfarin dose with next INR in 4 weeks       Summary  As of 2/2/2024      Full warfarin instructions:  3.75 mg every Mon, Wed, Fri; 2.5 mg all other days   Next INR check:  3/1/2024               Telephone call with Rajesh who verbalizes understanding and agrees to plan    Lab visit scheduled    Education provided:   Please call back if any changes to your diet, medications or how you've been taking warfarin    Plan made per [facility] anticoagulation protocol    Marilynn Ramires RN  Anticoagulation Clinic  2/2/2024    _______________________________________________________________________     Anticoagulation Episode Summary       Current INR goal:  2.0-3.0   TTR:  100.0% (1.1 mo)   Target end date:  Indefinite   Send INR reminders to:  [facility]  [facility]    Indications    PE (pulmonary thromboembolism) (H) [I26.99]  Homozygous Factor V Leiden mutation (H24) [D68.51]  Warfarin anticoagulation [Z79.01]             Comments:               Anticoagulation Care Providers       Provider Role Specialty Phone number    Ash Diaz MD Referring Internal Medicine [phone]

## 2024-02-20 ENCOUNTER — VIRTUAL VISIT (OUTPATIENT)
Dept: UROLOGY | Facility: CLINIC | Age: 39
End: 2024-02-20
Payer: COMMERCIAL

## 2024-02-20 ENCOUNTER — TELEPHONE (OUTPATIENT)
Dept: UROLOGY | Facility: CLINIC | Age: 39
End: 2024-02-20

## 2024-02-20 DIAGNOSIS — Z30.09 VASECTOMY EVALUATION: ICD-10-CM

## 2024-02-20 DIAGNOSIS — D68.51 HOMOZYGOUS FACTOR V LEIDEN MUTATION (H): Primary | ICD-10-CM

## 2024-02-20 PROCEDURE — 99204 OFFICE O/P NEW MOD 45 MIN: CPT | Mod: 95 | Performed by: UROLOGY

## 2024-02-20 ASSESSMENT — PAIN SCALES - GENERAL: PAINLEVEL: NO PAIN (0)

## 2024-02-20 NOTE — PROGRESS NOTES
VASECTOMY CONSULTATION NOTE  DATE OF VISIT: 2/20/2024  VASU GARNICA   PATIENT NAME: Rajesh Ordaz    YOB: 1985      REASON FOR CONSULTATION: Mr. Rajesh Ordaz is a 38 year old year old gentleman who is seen today requesting a vasectomy. He has 2 children - 5 year old and 1 year old - and he wishes to have a vasectomy for birth control. His wife is in agreement with this plan.     Homozygous factor V Leiden    PAST MEDICAL HISTORY:   Past Medical History:   Diagnosis Date    Allergic rhinitis due to other allergen     Unspecified asthma(493.90)     resolved (prev with exercise)       PAST SURGICAL HISTORY:   Past Surgical History:   Procedure Laterality Date    ZZC NONSPECIFIC PROCEDURE  2000    RAD/ULNER FX       MEDICATIONS:   Current Outpatient Medications:     amphetamine-dextroamphetamine (ADDERALL XR) 30 MG 24 hr capsule, TAKE 1 CAPSULE BY MOUTH IN THE MORNING WITH FOOD AS NEEDED FOR ADHD SYMPTOMS, Disp: , Rfl:     clotrimazole (LOTRIMIN) 1 % external cream, Apply topically 2 times daily, Disp: 30 g, Rfl: 1    warfarin ANTICOAGULANT (COUMADIN) 2.5 MG tablet, Take 1 and 1/2 tablets by mouth every Mon, Wed, Fri: Take 1 tablet all other days of the week or as instructed by the INR Clinic., Disp: 108 tablet, Rfl: 0    ALLERGIES:   Allergies   Allergen Reactions    No Known Drug Allergy        FAMILY HISTORY:   Family History   Problem Relation Age of Onset    Respiratory Maternal Uncle         asthma    Respiratory Maternal Aunt         asthma    C.A.D. No family hx of     Lipids No family hx of     Diabetes Paternal Grandmother        SOCIAL HISTORY:   Social History     Socioeconomic History    Marital status:      Spouse name: Not on file    Number of children: Not on file    Years of education: Not on file    Highest education level: Not on file   Occupational History    Occupation: student   Tobacco Use    Smoking status: Never    Smokeless tobacco: Never   Substance and Sexual  Activity    Alcohol use: Yes     Comment: occ    Drug use: No    Sexual activity: Not on file   Other Topics Concern     Service Not Asked    Blood Transfusions Not Asked    Caffeine Concern No     Comment: 0-1 can/day    Occupational Exposure Not Asked    Hobby Hazards Not Asked    Sleep Concern Not Asked    Stress Concern Not Asked    Weight Concern Not Asked    Special Diet Not Asked    Back Care Not Asked    Exercise Yes     Comment: Bike    Bike Helmet Not Asked    Seat Belt Yes    Self-Exams Not Asked   Social History Narrative    Not on file     Social Determinants of Health     Financial Resource Strain: Low Risk  (12/20/2023)    Financial Resource Strain     Within the past 12 months, have you or your family members you live with been unable to get utilities (heat, electricity) when it was really needed?: No   Food Insecurity: Low Risk  (12/20/2023)    Food Insecurity     Within the past 12 months, did you worry that your food would run out before you got money to buy more?: No     Within the past 12 months, did the food you bought just not last and you didn t have money to get more?: No   Transportation Needs: Low Risk  (12/20/2023)    Transportation Needs     Within the past 12 months, has lack of transportation kept you from medical appointments, getting your medicines, non-medical meetings or appointments, work, or from getting things that you need?: No   Physical Activity: Not on file   Stress: Not on file   Social Connections: Not on file   Interpersonal Safety: Low Risk  (12/20/2023)    Interpersonal Safety     Do you feel physically and emotionally safe where you currently live?: Yes     Within the past 12 months, have you been hit, slapped, kicked or otherwise physically hurt by someone?: No     Within the past 12 months, have you been humiliated or emotionally abused in other ways by your partner or ex-partner?: No   Housing Stability: Low Risk  (12/20/2023)    Housing Stability     Do you  have housing? : Yes     Are you worried about losing your housing?: No       PHYSICAL EXAM  Patient is a 38 year old  male   Vitals: There were no vitals taken for this visit.  There is no height or weight on file to calculate BMI.  General Appearance Adult:   Alert, no acute distress, oriented  Neuro: Alert, oriented, speech and mentation normal  Psych: affect and mood normal       DIAGNOSIS: Request for sterilization    PLAN: The risks of the procedure as well as expectations for recovery and outcomes were explained in detail to him.  He was counseled on the risks for bleeding infection and pain after the procedure. We discussed the risk of post-vasectomy pain syndrome.  He was instructed to continue to use contraception until he had proven azoospermia on a semen specimen.  This would normally be collected at least 3 months after the procedure. Also discussed the rare, but possible risk of re-canalization of the vas, even after successful vasectomy with sterile semen specimen.  He was instructed to hold all anticoagulants medications for one week prior to the procedure.  It was recommended that he have someone else drive him home after his vasectomy.  In light of these risks and expectations he would like to proceed.  We are scheduling a vasectomy in the office in the near future.    Pt. Understands:  -1/1000-1/3000 risk of future pregnancy even with perfectly done vasectomy  -vasectomy is a permanent procedure    -he may cryopreserve sperm if he wishes   -1-5% risk of post-vasectomy pain syndrome   -1-5% risk of complication, primarily infection or bleeding  - he needs to have a semen sample that shows no sperm before getting approval for unprotected intercourse.      Factor V Leiden  - he will need to be off Warfarin for 5 days     Thank you for the kind consultation.    Time spent: 15 minutes spent on the date of the encounter doing chart review, history and exam, documentation and further activities as noted  above.     Eh Grider MD   Urology  [facility]  Clinic Phone [phone]    Virtual Visit Details    Type of service:  Video Visit     Originating Location (pt. Location): Home    Distant Location (provider location):  On-site  Platform used for Video Visit: Rashard

## 2024-02-20 NOTE — NURSING NOTE
Is the patient currently in the state of [place]? YES    Visit mode:VIDEO    If the visit is dropped, the patient can be reconnected by: VIDEO VISIT: Text to cell phone:   Telephone Information:   Mobile [phone]       Will anyone else be joining the visit? NO  (If patient encounters technical issues they should call [phone])    How would you like to obtain your AVS? MyChart    Are changes needed to the allergy or medication list? No    Reason for visit: Consult    Lynda DUMONT

## 2024-02-20 NOTE — TELEPHONE ENCOUNTER
Hello,     Patient completed a Vasectomy consult with Dr. Grider today. He prefers to schedule at [place] as it is closer. Can you please reach out to patient for scheduling? he will also need to be off Warfarin for 5 days     Jessi kay Complex   Dermatology, Surgery, Urology  [facility]

## 2024-03-01 ENCOUNTER — LAB (OUTPATIENT)
Dept: LAB | Facility: CLINIC | Age: 39
End: 2024-03-01
Payer: COMMERCIAL

## 2024-03-01 ENCOUNTER — ANTICOAGULATION THERAPY VISIT (OUTPATIENT)
Dept: ANTICOAGULATION | Facility: CLINIC | Age: 39
End: 2024-03-01

## 2024-03-01 ENCOUNTER — TELEPHONE (OUTPATIENT)
Dept: INTERNAL MEDICINE | Facility: CLINIC | Age: 39
End: 2024-03-01

## 2024-03-01 DIAGNOSIS — D68.51 HOMOZYGOUS FACTOR V LEIDEN MUTATION (H): ICD-10-CM

## 2024-03-01 DIAGNOSIS — I82.409 DVT (DEEP VENOUS THROMBOSIS) (H): Primary | ICD-10-CM

## 2024-03-01 DIAGNOSIS — I26.99 PE (PULMONARY THROMBOEMBOLISM) (H): Primary | ICD-10-CM

## 2024-03-01 DIAGNOSIS — Z79.01 WARFARIN ANTICOAGULATION: ICD-10-CM

## 2024-03-01 DIAGNOSIS — I26.99 PE (PULMONARY THROMBOEMBOLISM) (H): ICD-10-CM

## 2024-03-01 DIAGNOSIS — I82.401 DEEP VEIN THROMBOSIS (DVT) OF RIGHT LOWER EXTREMITY, UNSPECIFIED CHRONICITY, UNSPECIFIED VEIN (H): ICD-10-CM

## 2024-03-01 LAB — INR BLD: 2.2 (ref 0.9–1.1)

## 2024-03-01 PROCEDURE — 85610 PROTHROMBIN TIME: CPT

## 2024-03-01 PROCEDURE — 36416 COLLJ CAPILLARY BLOOD SPEC: CPT

## 2024-03-01 NOTE — TELEPHONE ENCOUNTER
Rajesh has a vasectomy scheduled for 4/29/24. 5 day warfarin hold has been advised. His next INR check is scheduled for 4/5/24.

## 2024-03-01 NOTE — PROGRESS NOTES
ANTICOAGULATION MANAGEMENT     Rajesh Ordaz 38 year old male is on warfarin with therapeutic INR result. (Goal INR 2.0-3.0)    Recent labs: (last 7 days)     03/01/24  0849   INR 2.2*       ASSESSMENT     Warfarin Lab Questionnaire    Warfarin Doses Last 7 Days      3/1/2024     8:45 AM   Dose in Tablet or Mg   TAB or MG? milligram (mg)     Pt Rptd Dose HAO MONDAY TUESDAY WED THURS FRIDAY SATURDAY   3/1/2024   8:45 AM 2.5 3.75 2.5 3.75 2.5 3.75 2.5         3/1/2024   Warfarin Lab Questionnaire   Missed doses within past 14 days? No   Changes in diet or alcohol within past 14 days? No   Medication changes since last result? No   Injuries or illness since last result? No   New shortness of breath, severe headaches or sudden changes in vision since last result? No   Abnormal bleeding since last result? No   Upcoming surgery, procedure? No   Best number to call with results? [phone]     Previous result: Therapeutic last 2(+) visits  Additional findings: Vasectomy scheduled for 4/29/24. TE sent to [facility] today for hold/bridge plan.       PLAN     Recommended plan for no diet, medication or health factor changes affecting INR     Dosing Instructions: Continue your current warfarin dose with next INR in 5 weeks       Summary  As of 3/1/2024      Full warfarin instructions:  3.75 mg every Mon, Wed, Fri; 2.5 mg all other days   Next INR check:  4/5/2024               Telephone call with Rajesh who verbalizes understanding and agrees to plan    Lab visit scheduled    Education provided:   Please call back if any changes to your diet, medications or how you've been taking warfarin  Importance of notifying anticoagulation clinic for: upcoming surgeries and procedures 2 weeks in advance    Plan made per [facility] anticoagulation protocol    Marilynn Ramires RN  Anticoagulation Clinic  3/1/2024    _______________________________________________________________________     Anticoagulation Episode Summary       Current  INR goal:  2.0-3.0   TTR:  100.0% (2.1 mo)   Target end date:  Indefinite   Send INR reminders to:  [facility]    Indications    PE (pulmonary thromboembolism) (H) [I26.99]  Homozygous Factor V Leiden mutation (H24) [D68.51]  Warfarin anticoagulation [Z79.01]             Comments:               Anticoagulation Care Providers       Provider Role Specialty Phone number    Ash Diaz MD Referring Internal Medicine [phone]

## 2024-04-05 ENCOUNTER — LAB (OUTPATIENT)
Dept: LAB | Facility: CLINIC | Age: 39
End: 2024-04-05
Payer: COMMERCIAL

## 2024-04-05 ENCOUNTER — ANTICOAGULATION THERAPY VISIT (OUTPATIENT)
Dept: ANTICOAGULATION | Facility: CLINIC | Age: 39
End: 2024-04-05

## 2024-04-05 DIAGNOSIS — D68.51 HOMOZYGOUS FACTOR V LEIDEN MUTATION (H): ICD-10-CM

## 2024-04-05 DIAGNOSIS — I26.99 PE (PULMONARY THROMBOEMBOLISM) (H): ICD-10-CM

## 2024-04-05 DIAGNOSIS — I26.99 PE (PULMONARY THROMBOEMBOLISM) (H): Primary | ICD-10-CM

## 2024-04-05 DIAGNOSIS — Z79.01 WARFARIN ANTICOAGULATION: ICD-10-CM

## 2024-04-05 DIAGNOSIS — I82.401 DEEP VEIN THROMBOSIS (DVT) OF RIGHT LOWER EXTREMITY, UNSPECIFIED CHRONICITY, UNSPECIFIED VEIN (H): ICD-10-CM

## 2024-04-05 LAB — INR BLD: 2.9 (ref 0.9–1.1)

## 2024-04-05 PROCEDURE — 85610 PROTHROMBIN TIME: CPT

## 2024-04-05 PROCEDURE — 36416 COLLJ CAPILLARY BLOOD SPEC: CPT

## 2024-04-05 NOTE — PROGRESS NOTES
ANTICOAGULATION MANAGEMENT     Rajesh Ordaz 38 year old male is on warfarin with therapeutic INR result. (Goal INR 2.0-3.0)    Recent labs: (last 7 days)     04/05/24  0839   INR 2.9*       ASSESSMENT     Warfarin Lab Questionnaire    Warfarin Doses Last 7 Days      4/5/2024     8:35 AM   Dose in Tablet or Mg   TAB or MG? milligram (mg)     Pt Rptd Dose SUNDAY MONDAY TUESDAY WED THURS FRIDAY SATURDAY 4/5/2024   8:35 AM 2.5 3.75 2.5 3.75 2.5 3.75 2.5         4/5/2024   Warfarin Lab Questionnaire   Missed doses within past 14 days? No   Changes in diet or alcohol within past 14 days? No   Medication changes since last result? No   Injuries or illness since last result? No   New shortness of breath, severe headaches or sudden changes in vision since last result? No   Abnormal bleeding since last result? No   Upcoming surgery, procedure? Yes   Please explain, date scheduled? 4/29   Best number to call with results? [phone]     Previous result: Therapeutic last 2(+) visits  Additional findings: Upcoming surgery/procedure vasectomy scheduled on 4/29/24, encounter sent to [facility] on 3/1/24       PLAN     Recommended plan for no diet, medication or health factor changes affecting INR     Dosing Instructions: Continue your current warfarin dose with next INR in 4 weeks       Summary  As of 4/5/2024      Full warfarin instructions:  3.75 mg every Mon, Wed, Fri; 2.5 mg all other days   Next INR check:  5/6/2024               Detailed voice message left for Rajesh with dosing instructions and follow up date.     Contact [phone]  to schedule and with any changes, questions or concerns.     Education provided:   [facility] will call with warfarin hold instructions once received    Plan made per [facility] anticoagulation protocol    Hermila Gore, RN  Anticoagulation Clinic  4/5/2024    _______________________________________________________________________     Anticoagulation Episode Summary       Current INR goal:  2.0-3.0   TTR:   100.0% (3.2 mo)   Target end date:  Indefinite   Send INR reminders to:  [facility]    Indications    PE (pulmonary thromboembolism) (H) [I26.99]  Homozygous Factor V Leiden mutation (H24) [D68.51]  Warfarin anticoagulation [Z79.01]             Comments:               Anticoagulation Care Providers       Provider Role Specialty Phone number    Ash Diaz MD Referring Internal Medicine [phone]

## 2024-04-19 ENCOUNTER — TELEPHONE (OUTPATIENT)
Dept: ANTICOAGULATION | Facility: CLINIC | Age: 39
End: 2024-04-19
Payer: COMMERCIAL

## 2024-04-19 ENCOUNTER — MYC MEDICAL ADVICE (OUTPATIENT)
Dept: ANTICOAGULATION | Facility: CLINIC | Age: 39
End: 2024-04-19
Payer: COMMERCIAL

## 2024-04-19 DIAGNOSIS — I82.409 DVT (DEEP VENOUS THROMBOSIS) (H): ICD-10-CM

## 2024-04-19 DIAGNOSIS — D68.51 HOMOZYGOUS FACTOR V LEIDEN MUTATION (H): ICD-10-CM

## 2024-04-19 DIAGNOSIS — I26.99 PE (PULMONARY THROMBOEMBOLISM) (H): ICD-10-CM

## 2024-04-19 RX ORDER — ENOXAPARIN SODIUM 150 MG/ML
120 INJECTION SUBCUTANEOUS EVERY 12 HOURS
Qty: 22.4 ML | Refills: 0 | Status: SHIPPED | OUTPATIENT
Start: 2024-04-19 | End: 2024-04-22

## 2024-04-19 NOTE — PROGRESS NOTES
Patient will hold warfarin for 5 days with enoxaparin bridging.  Warfarin dosing calendar updated.

## 2024-04-19 NOTE — TELEPHONE ENCOUNTER
Notified patient of hold plan. He verbalized understanding of hold plan.  [employer] message also sent and Lovenox rx sent to the pharmacy.

## 2024-04-19 NOTE — TELEPHONE ENCOUNTER
General Call    Contacts         Type Contact Phone/Fax    04/19/2024 10:18 AM CDT Phone (Incoming) Rajesh Ordaz (Self) [phone] (M)     Requesting call back from care team to go over medication before a procedure on 4/29/24 - needing a call back today          Reason for Call:  Medication Questions    What are your questions or concerns:  Would like a call back from INR RN to go over medication changes before procedure on 4/29/24    Date of last appointment with provider: N/A    Could we send this information to you in [employer] or would you prefer to receive a phone call?:   Patient would prefer a phone call   Okay to leave a detailed message?: Yes at Cell number on file:    Telephone Information:   Mobile [phone]

## 2024-04-22 ENCOUNTER — TELEPHONE (OUTPATIENT)
Dept: INTERNAL MEDICINE | Facility: CLINIC | Age: 39
End: 2024-04-22
Payer: COMMERCIAL

## 2024-04-22 RX ORDER — ENOXAPARIN SODIUM 150 MG/ML
120 INJECTION SUBCUTANEOUS EVERY 12 HOURS
Qty: 22.4 ML | Refills: 0 | Status: SHIPPED | OUTPATIENT
Start: 2024-04-22

## 2024-04-22 NOTE — TELEPHONE ENCOUNTER
Medication Question or Refill        What medication are you calling about (include dose and sig)?: Lovenox    Preferred Pharmacy:   [facility] [address] in [place]      Controlled Substance Agreement on file:   CSA -- Patient Level:    CSA: None found at the patient level.       Who prescribed the medication?:     Do you need a refill?     When did you use the medication last?     Patient offered an appointment?     Do you have any questions or concerns?  Yes: please contact [facility] as this patient had already been dispensed the Lovenox before the nurse cancelled it. Pharmacist has concerns about this patient getting multiple prescriptions      Could we send this information to you in [employer] or would you prefer to receive a phone call?:

## 2024-04-22 NOTE — TELEPHONE ENCOUNTER
Left voicemail to inform pharmacy that per My Chart message from patient, since his wife picked up the lovenox already, Rajesh called to cancel the lovenox prescription that was sent to  pharmacy.    Hermila Gore RN  Anticoagulation Clinic

## 2024-04-26 ENCOUNTER — TELEPHONE (OUTPATIENT)
Dept: INTERNAL MEDICINE | Facility: CLINIC | Age: 39
End: 2024-04-26
Payer: COMMERCIAL

## 2024-04-26 NOTE — TELEPHONE ENCOUNTER
General Call    Contacts         Type Contact Phone/Fax    04/26/2024 08:34 AM CDT Phone (Incoming) Rajesh Ordaz (Self) [phone] (M)          Reason for Call:  Patient    What are your questions or concerns:  Questions regarding Lovenox medications    Date of last appointment with provider: 04/05/24 INR/Primary provider Dr. Diaz    Could we send this information to you in [facility] or would you prefer to receive a phone call?:   Patient would prefer a phone call   Okay to leave a detailed message?: Yes at Cell number on file:    Telephone Information:   Mobile [phone]     Jessi Griffiths   [facility]  Central Scheduler

## 2024-04-26 NOTE — TELEPHONE ENCOUNTER
"Spoke with Rajesh    He did first Lovenox injection this morning and it stung a little more than he remembered. He also had some nausea, sweating, and light headedness a minute or two after. Also developed a headache. These symptoms went away within 10 minutes.  We discussed technique to inject Lovenox, locations to use, and things to do to help decrease worry/anxiety related to \"stabbing yourself with a needle\". He will try sitting down next time, he will drink a glass of water before, and he will have TV or something on his phone to distract him for a few minutes after the injection.    He will watch for recurrence of the symptoms as well as any new symptoms like rash, syncope, vomiting, etc and he will go to the ED if these occur.    He did have some bright red blood after wiping post BM last night but nothing today. No persistent bleeding. He says he has had this before, even before being on blood thinners, and wanted to let us know. He has no concerns about it.    He will call back with any other questions or concerns.    Gia Collier RN   "

## 2024-04-29 ENCOUNTER — OFFICE VISIT (OUTPATIENT)
Dept: UROLOGY | Facility: CLINIC | Age: 39
End: 2024-04-29
Payer: COMMERCIAL

## 2024-04-29 VITALS — SYSTOLIC BLOOD PRESSURE: 111 MMHG | OXYGEN SATURATION: 98 % | DIASTOLIC BLOOD PRESSURE: 77 MMHG | HEART RATE: 98 BPM

## 2024-04-29 DIAGNOSIS — Z30.2 ENCOUNTER FOR STERILIZATION: Primary | ICD-10-CM

## 2024-04-29 PROCEDURE — 88302 TISSUE EXAM BY PATHOLOGIST: CPT | Performed by: PATHOLOGY

## 2024-04-29 PROCEDURE — 55250 REMOVAL OF SPERM DUCT(S): CPT | Performed by: UROLOGY

## 2024-04-29 RX ORDER — LIDOCAINE HYDROCHLORIDE 10 MG/ML
20 INJECTION, SOLUTION EPIDURAL; INFILTRATION; INTRACAUDAL; PERINEURAL ONCE
Status: COMPLETED | OUTPATIENT
Start: 2024-04-29 | End: 2024-04-29

## 2024-04-29 RX ADMIN — LIDOCAINE HYDROCHLORIDE 20 ML: 10 INJECTION, SOLUTION EPIDURAL; INFILTRATION; INTRACAUDAL; PERINEURAL at 09:41

## 2024-04-29 NOTE — NURSING NOTE
Pt has signed the consent form today confirming that a VASECTOMY is the correct procedure today. I verbally confirmed the patient s identity using two indicators, that the pt has started any medication as prescribed for this procedure, relevant allergies, that they have not used blood thinning products in the last 7 - 10 days, and that the correct equipment was available. Immediately prior to starting the procedure I conducted the Time Out with the MD and re-confirmed the patient s name and procedure. Pathology ordered and sent to lab. Post-procedure information, also specimen collection cup with instructions, given to pt at time of check out.     JUAN PABLO Bradford CMA

## 2024-04-29 NOTE — PATIENT INSTRUCTIONS
POST VASECTOMY INSTRUCTIONS    1.) If you have any concerns or questions, please contact our office at [phone].      2.) It is okay to take a shower, however, do not soak in water (bath,swimming, hot tub,etc....) until your incision is healed.    3.) You might notice some swelling, mild bruising, and discomfort for several days after your vasectomy. This is to be expected. For at least the next 24 hours, an ice pack should be applied for 20 minutes every hour that you are awake. Ice will help with discomfort and swelling. Do not place directly on the skin.    4.) No intercourse, strenuous activity or exercise for at least 7-10 days, even if you feel fine.    5.) You need to wear good scrotal support while you are healing. We strongly recommend an athletic supporter or a pair of regular briefs that are one size too small. Boxer briefs do not offer enough support.    6.) Tylenol as directed on the bottle is preferred for discomfort. Please avoid any blood thinning products such as ibuprofen and aspirin (Motrin, Advil, Excedrin, Aleve, ect..) for at least the next week.    7.) It is normal to have mild drainage from the incision area for several days. However, please contact our office if you notice: bright red blood that does not stop after three days, increased pain, heat at the incision, red streaks, foul smelling discharge, or if you start to run a fever.     8.) YOU MUST CONTINUE BIRTH CONTROL UNTIL WE CONFIRM YOUR STERILITY.  This process can take up to a year to complete (rare occurrence).     9.) You have been given a form with specimen cup and instructions for your follow up specimen. You will be cleared once we receive ONE negative specimen. If your specimen comes back positive (sperm seen) you will be asked to repeat the test. This does not mean that your vasectomy has failed.

## 2024-04-29 NOTE — LETTER
4/29/2024       RE: Rajesh Ordaz  [address] [facility] [address]     Dear Colleague,    Thank you for referring your patient, Rajesh Ordaz, to the [facility] at [facility]. Please see a copy of my visit note below.    OFFICE VASECTOMY OPERATIVE NOTE  [facility]     DATE: 04/29/24  PATIENT: Rajesh Ordaz    YOB: 1985    Rajesh Ordaz is a 39 year old male.  He has 2 children and he wishes a vasectomy for birth control.  He has read the brochure and he has shaved himself.  I reviewed the vasectomy procedure with him explaining that it would be done with a local anesthetic given just in the location where the vasectomy would be done.  It would be done through incisions with the removal of segments of the vasa, cauterization of the ends, and burying the ends separate with sutures.      Pt. Understands:  -1/1000-1/3000 risk of future pregnancy even with perfectly done vasectomy  -vasectomy is a permanent procedure    -he may cryopreserve sperm if he wishes   -1-5% risk of post-vasectomy pain syndrome   -1-5% risk of complication, primarily infection or bleeding  - he needs to have a semen sample that shows no sperm before getting approval for unprotected intercourse.      Complications such as bleeding, infection, and damage to other tissues in the area were discussed. I recommended that an ice bag be placed on the scrotum off and on tonight to help reduce pain and swelling.      He was reminded that he was not sterile immediately after the vasectomy that it would take at least 20 ejaculations to empty the vas of any remaining sperm.  He was not to provide a semen sample until after the 20th ejaculation and not before 12 weeks after the vas. He was  to fulfill both of those requirements.   He understands it is his responsibility to find out the results of the vas before proceeding with intercourse without birth control  protection.  Other items discussed were activity afterwards, returning to work, voluntary physical activity,  resuming sexual activity, clothing to wear, bathing, and care of the vas site and expected changes in the site as healing progresses.  After signing the permit, bilateral vasectomy was done as described below.     ANESTHESIA: Local    DETAILS OF PROCEDURE: The risks of the procedure were explained in detail to the patient and informed consent was obtained. The patient was placed supine on the procedure table and the penis and scrotum were prepped and draped in the standard sterile fashion. The right vas deferens was isolated and brought up to the skin. 1% lidocaine local anesthesia was used to infiltrate the skin and the spermatic cord. A small incision was created and adventitial tissues were swept away from the vas. A 1 cm segment of the vas was excised and sent for pathology. The proximal and distal lumina of the vas were cauterized and then each segment was tied off in a knuckling-fashion with a 3-0 vicryl suture. Hemostasis was ensured and the segments were released back into the scrotum. Meticulous hemostasis was achieved. The skin was closed with 3-0 chromic suture in a horizontal mattress fashion. Next the left vas was brought to the skin and a vasectomy was performed in the similar fashion. The skin was closed with 3-0 chromic suture in a horizontal mattress fashion.    COMPLICATIONS: None    TAKE HOME MEDICATIONS: Tylenol every 6 hours, PRN    DISMISSAL INSTRUCTIONS:  - Ice pack to scrotum 15 to 20 minutes each hour awake for 36 to 40 hours.  - No strenuous activity or ejaculation for at least 7 days.  - No unprotected sexual activity until proven azoospermia on semen samples at 3 months.  - Referred to patient handout for normal postop expectations and indications to contact nurse or physician.  -We reviewed the anticoagulation schedule and he may follow-up with the plan provided from his Coumadin  clinic    M.WOODROW.: Eh Grider MD

## 2024-04-29 NOTE — PROGRESS NOTES
OFFICE VASECTOMY OPERATIVE NOTE  [facility]     DATE: 04/29/24  PATIENT: Rajesh Ordaz    YOB: 1985    Rajesh Ordaz is a 39 year old male.  He has 2 children and he wishes a vasectomy for birth control.  He has read the brochure and he has shaved himself.  I reviewed the vasectomy procedure with him explaining that it would be done with a local anesthetic given just in the location where the vasectomy would be done.  It would be done through incisions with the removal of segments of the vasa, cauterization of the ends, and burying the ends separate with sutures.      Pt. Understands:  -1/1000-1/3000 risk of future pregnancy even with perfectly done vasectomy  -vasectomy is a permanent procedure    -he may cryopreserve sperm if he wishes   -1-5% risk of post-vasectomy pain syndrome   -1-5% risk of complication, primarily infection or bleeding  - he needs to have a semen sample that shows no sperm before getting approval for unprotected intercourse.      Complications such as bleeding, infection, and damage to other tissues in the area were discussed. I recommended that an ice bag be placed on the scrotum off and on tonight to help reduce pain and swelling.      He was reminded that he was not sterile immediately after the vasectomy that it would take at least 20 ejaculations to empty the vas of any remaining sperm.  He was not to provide a semen sample until after the 20th ejaculation and not before 12 weeks after the vas. He was  to fulfill both of those requirements.   He understands it is his responsibility to find out the results of the vas before proceeding with intercourse without birth control protection.  Other items discussed were activity afterwards, returning to work, voluntary physical activity,  resuming sexual activity, clothing to wear, bathing, and care of the vas site and expected changes in the site as healing progresses.  After signing the permit, bilateral vasectomy was done  as described below.     ANESTHESIA: Local    DETAILS OF PROCEDURE: The risks of the procedure were explained in detail to the patient and informed consent was obtained. The patient was placed supine on the procedure table and the penis and scrotum were prepped and draped in the standard sterile fashion. The right vas deferens was isolated and brought up to the skin. 1% lidocaine local anesthesia was used to infiltrate the skin and the spermatic cord. A small incision was created and adventitial tissues were swept away from the vas. A 1 cm segment of the vas was excised and sent for pathology. The proximal and distal lumina of the vas were cauterized and then each segment was tied off in a knuckling-fashion with a 3-0 vicryl suture. Hemostasis was ensured and the segments were released back into the scrotum. Meticulous hemostasis was achieved. The skin was closed with 3-0 chromic suture in a horizontal mattress fashion. Next the left vas was brought to the skin and a vasectomy was performed in the similar fashion. The skin was closed with 3-0 chromic suture in a horizontal mattress fashion.    COMPLICATIONS: None    TAKE HOME MEDICATIONS: Tylenol every 6 hours, PRN    DISMISSAL INSTRUCTIONS:  - Ice pack to scrotum 15 to 20 minutes each hour awake for 36 to 40 hours.  - No strenuous activity or ejaculation for at least 7 days.  - No unprotected sexual activity until proven azoospermia on semen samples at 3 months.  - Referred to patient handout for normal postop expectations and indications to contact nurse or physician.  -We reviewed the anticoagulation schedule and he may follow-up with the plan provided from his Coumadin clinic    M.D.: Eh Grider MD

## 2024-04-29 NOTE — NURSING NOTE
The following medication was given:     MEDICATION:  Lidocaine 1% Soln  ROUTE:  infiltration  SITE: R and L vas deferens  DOSE: 200mg/20mL  LOT #: [id number]  : Erik  EXPIRATION DATE: 01 mar 2025  NDC#: 0765-9431-77   Was there drug waste? no  Multi-dose vial: No    JUAN PABLO Bradford CMA

## 2024-05-01 LAB
PATH REPORT.COMMENTS IMP SPEC: NORMAL
PATH REPORT.COMMENTS IMP SPEC: NORMAL
PATH REPORT.FINAL DX SPEC: NORMAL
PATH REPORT.GROSS SPEC: NORMAL
PATH REPORT.MICROSCOPIC SPEC OTHER STN: NORMAL
PATH REPORT.RELEVANT HX SPEC: NORMAL
PHOTO IMAGE: NORMAL

## 2024-05-07 ENCOUNTER — LAB (OUTPATIENT)
Dept: LAB | Facility: CLINIC | Age: 39
End: 2024-05-07
Payer: COMMERCIAL

## 2024-05-07 ENCOUNTER — ANTICOAGULATION THERAPY VISIT (OUTPATIENT)
Dept: ANTICOAGULATION | Facility: CLINIC | Age: 39
End: 2024-05-07

## 2024-05-07 DIAGNOSIS — I82.401 DEEP VEIN THROMBOSIS (DVT) OF RIGHT LOWER EXTREMITY, UNSPECIFIED CHRONICITY, UNSPECIFIED VEIN (H): ICD-10-CM

## 2024-05-07 DIAGNOSIS — D68.51 HOMOZYGOUS FACTOR V LEIDEN MUTATION (H): ICD-10-CM

## 2024-05-07 DIAGNOSIS — I26.99 PE (PULMONARY THROMBOEMBOLISM) (H): ICD-10-CM

## 2024-05-07 DIAGNOSIS — I26.99 PE (PULMONARY THROMBOEMBOLISM) (H): Primary | ICD-10-CM

## 2024-05-07 DIAGNOSIS — Z79.01 WARFARIN ANTICOAGULATION: ICD-10-CM

## 2024-05-07 LAB — INR BLD: 2.1 (ref 0.9–1.1)

## 2024-05-07 PROCEDURE — 36416 COLLJ CAPILLARY BLOOD SPEC: CPT

## 2024-05-07 PROCEDURE — 85610 PROTHROMBIN TIME: CPT

## 2024-05-07 NOTE — PROGRESS NOTES
ANTICOAGULATION MANAGEMENT     Rajesh Ordaz 39 year old male is on warfarin with therapeutic INR result. (Goal INR 2.0-3.0)    Recent labs: (last 7 days)     05/07/24  0858   INR 2.1*       ASSESSMENT     Warfarin Lab Questionnaire    Warfarin Doses Last 7 Days--dosing noted below is from 4/28/24 and beyond      5/7/2024     8:55 AM   Dose in Tablet or Mg   TAB or MG? milligram (mg)     Pt Rptd Dose SUNDAY MONDAY TUESDAY WED THURS FRIDAY SATURDAY 5/7/2024   8:55 AM 0 7.5 2.5 3.75 2.5 3.75 2.5         5/7/2024   Warfarin Lab Questionnaire   Missed doses within past 14 days? No, Yes, intentional hold 4/24-4/28/24   Changes in diet or alcohol within past 14 days? No   Medication changes since last result? No   Injuries or illness since last result? No, Yes, vasectomy on 4/29/24   New shortness of breath, severe headaches or sudden changes in vision since last result? No   Abnormal bleeding since last result? No, Yes, patient reports some bruising after vasectomy but no other bleeding concerns.   Upcoming surgery, procedure? No   Best number to call with results? [phone]     Previous result: Therapeutic last 2(+) visits  Additional findings: Bridging with Enoxaparin until INR >= 2.0, OK to discontinue        PLAN     Recommended plan for temporary change(s) affecting INR     Dosing Instructions: Continue your current warfarin dose with next INR in 4 weeks       Summary  As of 5/7/2024      Full warfarin instructions:  3.75 mg every Mon, Wed, Fri; 2.5 mg all other days   Next INR check:  6/4/2024               Telephone call with Rajesh who verbalizes understanding and agrees to plan    Lab visit scheduled    Education provided:   Lovenox/Heparin education provided: disposal of syringes     Plan made per [facility] anticoagulation protocol    Hermila Gore, RN  Anticoagulation Clinic  5/7/2024    _______________________________________________________________________     Anticoagulation Episode Summary       Current INR  goal:  2.0-3.0   TTR:  100.0% (4.3 mo)   Target end date:  Indefinite   Send INR reminders to:  [facility]    Indications    PE (pulmonary thromboembolism) (H) [I26.99]  Homozygous Factor V Leiden mutation (H24) [D68.51]  Warfarin anticoagulation [Z79.01]             Comments:               Anticoagulation Care Providers       Provider Role Specialty Phone number    Ash Diaz MD Referring Internal Medicine [phone]

## 2024-06-04 ENCOUNTER — LAB (OUTPATIENT)
Dept: LAB | Facility: CLINIC | Age: 39
End: 2024-06-04
Payer: COMMERCIAL

## 2024-06-04 ENCOUNTER — ANTICOAGULATION THERAPY VISIT (OUTPATIENT)
Dept: ANTICOAGULATION | Facility: CLINIC | Age: 39
End: 2024-06-04

## 2024-06-04 DIAGNOSIS — D68.51 HOMOZYGOUS FACTOR V LEIDEN MUTATION (H): ICD-10-CM

## 2024-06-04 DIAGNOSIS — Z79.01 WARFARIN ANTICOAGULATION: ICD-10-CM

## 2024-06-04 DIAGNOSIS — I26.99 PE (PULMONARY THROMBOEMBOLISM) (H): ICD-10-CM

## 2024-06-04 DIAGNOSIS — I26.99 PE (PULMONARY THROMBOEMBOLISM) (H): Primary | ICD-10-CM

## 2024-06-04 DIAGNOSIS — I82.401 DEEP VEIN THROMBOSIS (DVT) OF RIGHT LOWER EXTREMITY, UNSPECIFIED CHRONICITY, UNSPECIFIED VEIN (H): ICD-10-CM

## 2024-06-04 LAB — INR BLD: 2.4 (ref 0.9–1.1)

## 2024-06-04 PROCEDURE — 85610 PROTHROMBIN TIME: CPT

## 2024-06-04 PROCEDURE — 36416 COLLJ CAPILLARY BLOOD SPEC: CPT

## 2024-06-04 NOTE — PROGRESS NOTES
ANTICOAGULATION MANAGEMENT     Rajesh Ordaz 39 year old male is on warfarin with therapeutic INR result. (Goal INR 2.0-3.0)    Recent labs: (last 7 days)     06/04/24  0846   INR 2.4*       ASSESSMENT     Source(s): Chart Review and Patient/Caregiver Call     Warfarin doses taken: Warfarin taken as instructed  Diet: No new diet changes identified  Medication/supplement changes: None noted  New illness, injury, or hospitalization: No  Signs or symptoms of bleeding or clotting: No  Previous result: Therapeutic last 2(+) visits  Additional findings: None       PLAN     Recommended plan for no diet, medication or health factor changes affecting INR     Dosing Instructions: Continue your current warfarin dose with next INR in 5-6 weeks, patient elected for 6 weeks.      Summary  As of 6/4/2024      Full warfarin instructions:  3.75 mg every Mon, Wed, Fri; 2.5 mg all other days   Next INR check:  7/16/2024               Telephone call with Rajesh who verbalizes understanding and agrees to plan    Lab visit scheduled    Education provided:   None required    Plan made per [facility] anticoagulation protocol    Hermila Gore RN  Anticoagulation Clinic  6/4/2024    _______________________________________________________________________     Anticoagulation Episode Summary       Current INR goal:  2.0-3.0   TTR:  100.0% (5.2 mo)   Target end date:  Indefinite   Send INR reminders to:  [facility]    Indications    PE (pulmonary thromboembolism) (H) [I26.99]  Homozygous Factor V Leiden mutation (H24) [D68.51]  Warfarin anticoagulation [Z79.01]             Comments:               Anticoagulation Care Providers       Provider Role Specialty Phone number    Ash Diaz MD Referring Internal Medicine [phone]

## 2024-07-18 ENCOUNTER — ANTICOAGULATION THERAPY VISIT (OUTPATIENT)
Dept: ANTICOAGULATION | Facility: CLINIC | Age: 39
End: 2024-07-18

## 2024-07-18 ENCOUNTER — LAB (OUTPATIENT)
Dept: LAB | Facility: CLINIC | Age: 39
End: 2024-07-18
Payer: COMMERCIAL

## 2024-07-18 DIAGNOSIS — D68.51 HOMOZYGOUS FACTOR V LEIDEN MUTATION (H): ICD-10-CM

## 2024-07-18 DIAGNOSIS — I26.99 PE (PULMONARY THROMBOEMBOLISM) (H): ICD-10-CM

## 2024-07-18 DIAGNOSIS — I26.99 PE (PULMONARY THROMBOEMBOLISM) (H): Primary | ICD-10-CM

## 2024-07-18 DIAGNOSIS — I82.401 DEEP VEIN THROMBOSIS (DVT) OF RIGHT LOWER EXTREMITY, UNSPECIFIED CHRONICITY, UNSPECIFIED VEIN (H): ICD-10-CM

## 2024-07-18 DIAGNOSIS — Z79.01 WARFARIN ANTICOAGULATION: ICD-10-CM

## 2024-07-18 LAB — INR BLD: 2.3 (ref 0.9–1.1)

## 2024-07-18 PROCEDURE — 85610 PROTHROMBIN TIME: CPT

## 2024-07-18 PROCEDURE — 36416 COLLJ CAPILLARY BLOOD SPEC: CPT

## 2024-07-18 NOTE — PROGRESS NOTES
ANTICOAGULATION MANAGEMENT     Rajesh Ordaz 39 year old male is on warfarin with therapeutic INR result. (Goal INR 2.0-3.0)    Recent labs: (last 7 days)     07/18/24  0813   INR 2.3*       ASSESSMENT     Warfarin Lab Questionnaire    Warfarin Doses Last 7 Days      7/18/2024     8:09 AM   Dose in Tablet or Mg   TAB or MG? milligram (mg)     Pt Rptd Dose SUNDAY MONDAY TUESDAY WED THURS FRIDAY SATURDAY 7/18/2024   8:09 AM 2.5 3.75 2.5 3.75 2.5 3.75 2.5         7/18/2024   Warfarin Lab Questionnaire   Missed doses within past 14 days? No   Changes in diet or alcohol within past 14 days? No   Medication changes since last result? No   Injuries or illness since last result? No   New shortness of breath, severe headaches or sudden changes in vision since last result? No   Abnormal bleeding since last result? No   Upcoming surgery, procedure? No   Best number to call with results? [phone]        Previous result: Therapeutic last 2(+) visits  Additional findings: None       PLAN     Recommended plan for no diet, medication or health factor changes affecting INR     Dosing Instructions: Continue your current warfarin dose with next INR in 6 weeks       Summary  As of 7/18/2024      Full warfarin instructions:  3.75 mg every Mon, Wed, Fri; 2.5 mg all other days   Next INR check:  8/28/2024               Telephone call with Rajesh who verbalizes understanding and agrees to plan and who agrees to plan and repeated back plan correctly    Lab visit scheduled    Education provided: None required    Plan made per [facility] anticoagulation protocol    Lisette Alvarado, RN  Anticoagulation Clinic  7/18/2024    _______________________________________________________________________     Anticoagulation Episode Summary       Current INR goal:  2.0-3.0   TTR:  100.0% (6.7 mo)   Target end date:  Indefinite   Send INR reminders to:  [facility]    Indications    PE (pulmonary thromboembolism) (H) [I26.99]  Homozygous  Factor V Leiden mutation (H24) [D68.51]  Warfarin anticoagulation [Z79.01]             Comments:               Anticoagulation Care Providers       Provider Role Specialty Phone number    Ash Diaz MD Referring Internal Medicine [phone]

## 2024-08-28 ENCOUNTER — LAB (OUTPATIENT)
Dept: LAB | Facility: CLINIC | Age: 39
End: 2024-08-28
Payer: COMMERCIAL

## 2024-08-28 ENCOUNTER — ANTICOAGULATION THERAPY VISIT (OUTPATIENT)
Dept: ANTICOAGULATION | Facility: CLINIC | Age: 39
End: 2024-08-28

## 2024-08-28 DIAGNOSIS — I82.401 DEEP VEIN THROMBOSIS (DVT) OF RIGHT LOWER EXTREMITY, UNSPECIFIED CHRONICITY, UNSPECIFIED VEIN (H): ICD-10-CM

## 2024-08-28 DIAGNOSIS — D68.51 HOMOZYGOUS FACTOR V LEIDEN MUTATION (H): ICD-10-CM

## 2024-08-28 DIAGNOSIS — I26.99 PE (PULMONARY THROMBOEMBOLISM) (H): ICD-10-CM

## 2024-08-28 DIAGNOSIS — I26.99 PE (PULMONARY THROMBOEMBOLISM) (H): Primary | ICD-10-CM

## 2024-08-28 DIAGNOSIS — Z79.01 WARFARIN ANTICOAGULATION: ICD-10-CM

## 2024-08-28 LAB — INR BLD: 1.9 (ref 0.9–1.1)

## 2024-08-28 PROCEDURE — 36416 COLLJ CAPILLARY BLOOD SPEC: CPT

## 2024-08-28 PROCEDURE — 85610 PROTHROMBIN TIME: CPT

## 2024-08-28 NOTE — PROGRESS NOTES
ANTICOAGULATION MANAGEMENT     Rajesh Ordaz 39 year old male is on warfarin with subtherapeutic INR result. (Goal INR 2.0-3.0)    Recent labs: (last 7 days)     08/28/24  0846   INR 1.9*       ASSESSMENT     Warfarin Lab Questionnaire    Warfarin Doses Last 7 Days      8/28/2024     8:44 AM   Dose in Tablet or Mg   TAB or MG? milligram (mg)     Pt Rptd Dose SUNDAY MONDAY TUESDAY WED THURS FRIDAY SATURDAY 8/28/2024   8:44 AM 2.5 3.75 2.5 3.75 2.5 3.75 2.5         8/28/2024   Warfarin Lab Questionnaire   Missed doses within past 14 days? No   Changes in diet or alcohol within past 14 days? No, Yes, patient reports eating more greens in the past week, he will resume his usual weekly regimen of vitamin K    Medication changes since last result? No   Injuries or illness since last result? No   New shortness of breath, severe headaches or sudden changes in vision since last result? No   Abnormal bleeding since last result? No   Upcoming surgery, procedure? No   Best number to call with results? [phone]        Previous result: Therapeutic last 2(+) visits  Additional findings: None       PLAN     Recommended plan for temporary change(s) affecting INR     Dosing Instructions: booster dose then continue your current warfarin dose with next INR in 2 weeks       Summary  As of 8/28/2024      Full warfarin instructions:  8/28: 5 mg; Otherwise 3.75 mg every Mon, Wed, Fri; 2.5 mg all other days   Next INR check:  9/12/2024               Telephone call with Rajesh who verbalizes understanding and agrees to plan    Lab visit scheduled    Education provided: Dietary considerations: importance of consistent vitamin K intake and impact of vitamin K foods on INR    Plan made per [facility] anticoagulation protocol    Hermila Gore, RN  Anticoagulation Clinic  8/28/2024    _______________________________________________________________________     Anticoagulation Episode Summary       Current INR goal:  2.0-3.0   TTR:  95.8% (8.1 mo)    Target end date:  Indefinite   Send INR reminders to:  [facility]    Indications    PE (pulmonary thromboembolism) (H) [I26.99]  Homozygous Factor V Leiden mutation (H24) [D68.51]  Warfarin anticoagulation [Z79.01]             Comments:               Anticoagulation Care Providers       Provider Role Specialty Phone number    Ash Diaz MD Referring Internal Medicine [phone]

## 2024-09-12 ENCOUNTER — TELEPHONE (OUTPATIENT)
Dept: INTERNAL MEDICINE | Facility: CLINIC | Age: 39
End: 2024-09-12

## 2024-09-12 ENCOUNTER — LAB (OUTPATIENT)
Dept: LAB | Facility: CLINIC | Age: 39
End: 2024-09-12
Payer: COMMERCIAL

## 2024-09-12 ENCOUNTER — MYC MEDICAL ADVICE (OUTPATIENT)
Dept: ANTICOAGULATION | Facility: CLINIC | Age: 39
End: 2024-09-12

## 2024-09-12 ENCOUNTER — ANTICOAGULATION THERAPY VISIT (OUTPATIENT)
Dept: ANTICOAGULATION | Facility: CLINIC | Age: 39
End: 2024-09-12

## 2024-09-12 DIAGNOSIS — I26.99 PE (PULMONARY THROMBOEMBOLISM) (H): ICD-10-CM

## 2024-09-12 DIAGNOSIS — Z79.01 WARFARIN ANTICOAGULATION: ICD-10-CM

## 2024-09-12 DIAGNOSIS — D68.51 HOMOZYGOUS FACTOR V LEIDEN MUTATION (H): ICD-10-CM

## 2024-09-12 DIAGNOSIS — I82.401 DEEP VEIN THROMBOSIS (DVT) OF RIGHT LOWER EXTREMITY, UNSPECIFIED CHRONICITY, UNSPECIFIED VEIN (H): ICD-10-CM

## 2024-09-12 DIAGNOSIS — I26.99 PE (PULMONARY THROMBOEMBOLISM) (H): Primary | ICD-10-CM

## 2024-09-12 LAB — INR BLD: 1.7 (ref 0.9–1.1)

## 2024-09-12 PROCEDURE — 36416 COLLJ CAPILLARY BLOOD SPEC: CPT

## 2024-09-12 PROCEDURE — 85610 PROTHROMBIN TIME: CPT

## 2024-09-12 NOTE — TELEPHONE ENCOUNTER
Patient Returning Call    Reason for call:  Patient is returning call     Information relayed to patient:      Patient has additional questions:        Could we send this information to you in [employer] or would you prefer to receive a phone call?:   Patient would prefer a phone call   Okay to leave a detailed message?:  at Cell number on file:    Telephone Information:   Mobile [phone]

## 2024-09-12 NOTE — TELEPHONE ENCOUNTER
Pt sent [employer] message, responded via [employer], stated no need to call back.    Tobin Johnson RN

## 2024-09-12 NOTE — PROGRESS NOTES
ANTICOAGULATION MANAGEMENT     Rajesh Ordaz 39 year old male is on warfarin with subtherapeutic INR result. (Goal INR 2.0-3.0)    Recent labs: (last 7 days)     09/12/24  0851   INR 1.7*       ASSESSMENT     Warfarin Lab Questionnaire    Warfarin Doses Last 7 Days      9/12/2024     8:49 AM   Dose in Tablet or Mg   TAB or MG? milligram (mg)     Pt Rptd Dose SUNDAY MONDAY TUESDAY WED THURS FRIDAY SATURDAY 9/12/2024   8:49 AM 2.5 3.75 2.5 3.75 2.5 3.75 2.5         9/12/2024   Warfarin Lab Questionnaire   Missed doses within past 14 days? No   Changes in diet or alcohol within past 14 days? No   Medication changes since last result? No   Injuries or illness since last result? No   New shortness of breath, severe headaches or sudden changes in vision since last result? No   Abnormal bleeding since last result? No   Upcoming surgery, procedure? No   Best number to call with results? [phone]        Previous result: Subtherapeutic  Additional findings: None       PLAN     Recommended plan for no diet, medication or health factor changes affecting INR     Dosing Instructions: Increase your warfarin dose (5.9% change) with next INR in 2 weeks       Summary  As of 9/12/2024      Full warfarin instructions:  2.5 mg every Sun, Tue, Fri; 3.75 mg all other days   Next INR check:  9/26/2024               Sent [employer] message with dosing and follow up instructions    Contact [phone] to schedule and with any changes, questions or concerns.     Education provided: Please call back or respond to [employer] message if any changes to your diet, medications or how you've been taking warfarin    Plan made per [facility] anticoagulation protocol    Tobin Johnson, RN  9/12/2024  Anticoagulation Clinic  [facility] for routing messages: main [facility] [facility] patient phone line: [phone]        _______________________________________________________________________     Anticoagulation Episode Summary       Current INR goal:   2.0-3.0   TTR:  90.2% (8.6 mo)   Target end date:  Indefinite   Send INR reminders to:  [facility]    Indications    PE (pulmonary thromboembolism) (H) [I26.99]  Homozygous Factor V Leiden mutation (H24) [D68.51]  Warfarin anticoagulation [Z79.01]             Comments:               Anticoagulation Care Providers       Provider Role Specialty Phone number    Ash Diaz MD Referring Internal Medicine [phone]

## 2024-09-27 ENCOUNTER — ANTICOAGULATION THERAPY VISIT (OUTPATIENT)
Dept: ANTICOAGULATION | Facility: CLINIC | Age: 39
End: 2024-09-27

## 2024-09-27 ENCOUNTER — LAB (OUTPATIENT)
Dept: LAB | Facility: CLINIC | Age: 39
End: 2024-09-27
Payer: COMMERCIAL

## 2024-09-27 DIAGNOSIS — D68.51 HOMOZYGOUS FACTOR V LEIDEN MUTATION (H): ICD-10-CM

## 2024-09-27 DIAGNOSIS — I82.401 DEEP VEIN THROMBOSIS (DVT) OF RIGHT LOWER EXTREMITY, UNSPECIFIED CHRONICITY, UNSPECIFIED VEIN (H): ICD-10-CM

## 2024-09-27 DIAGNOSIS — I26.99 PE (PULMONARY THROMBOEMBOLISM) (H): Primary | ICD-10-CM

## 2024-09-27 DIAGNOSIS — I26.99 PE (PULMONARY THROMBOEMBOLISM) (H): ICD-10-CM

## 2024-09-27 DIAGNOSIS — Z79.01 WARFARIN ANTICOAGULATION: ICD-10-CM

## 2024-09-27 LAB — INR BLD: 2.6 (ref 0.9–1.1)

## 2024-09-27 PROCEDURE — 85610 PROTHROMBIN TIME: CPT

## 2024-09-27 PROCEDURE — 36416 COLLJ CAPILLARY BLOOD SPEC: CPT

## 2024-09-27 NOTE — PROGRESS NOTES
ANTICOAGULATION MANAGEMENT     Rajesh MERA Sushma 39 year old male is on warfarin with therapeutic INR result. (Goal INR 2.0-3.0)    Recent labs: (last 7 days)     09/27/24  0817   INR 2.6*       ASSESSMENT     Warfarin Lab Questionnaire    Warfarin Doses Last 7 Days      9/27/2024     8:16 AM   Dose in Tablet or Mg   TAB or MG? tablet (tab)     Pt Rptd Dose SUNDAY MONDAY TUESDAY WED THURS FRIDAY SATURDAY 9/27/2024   8:16 AM 1.5 1 1.5 1 1.5 1 1.5     Confirmed that patient is taking warfarin as written above and not as previously directed.  This is the same weekly total, so still following the correct amount.  Will update anticoagulation clinic tracking calendar to reflect what patient has been taking and will continue on.        9/27/2024   Warfarin Lab Questionnaire   Missed doses within past 14 days? No   Changes in diet or alcohol within past 14 days? No   Medication changes since last result? No   Injuries or illness since last result? No   New shortness of breath, severe headaches or sudden changes in vision since last result? No   Abnormal bleeding since last result? Yes   If yes, please explain: some-will talk to rn.  Patient reported that he had been having some bleeding from hemorrhoids.  He was also having some constipation which may have triggered the symptoms.  Has not had any bleeding for a couple days.  No black stools or abdominal pain.    Upcoming surgery, procedure? No   Best number to call with results? [phone]        Previous result: Subtherapeutic  Additional findings: None       PLAN     Recommended plan for no diet, medication or health factor changes affecting INR     Dosing Instructions: Continue your current warfarin dose with next INR in 3 weeks       Summary  As of 9/27/2024      Full warfarin instructions:  2.5 mg every Mon, Wed, Fri; 3.75 mg all other days   Next INR check:  10/18/2024               Telephone call with Rajesh who agrees to plan and repeated back plan correctly    Lab  visit scheduled    Education provided: Please call back if any changes to your diet, medications or how you've been taking warfarin  Goal range and lab monitoring: goal range and significance of current result  Symptom monitoring: monitoring for bleeding signs and symptoms    Plan made per [facility] anticoagulation protocol    Stacia Marrufo RN  9/27/2024  Anticoagulation Clinic  [employer] for routing messages: main [facility] patient phone line: [phone]        _______________________________________________________________________     Anticoagulation Episode Summary       Current INR goal:  2.0-3.0   TTR:  88.9% (9.1 mo)   Target end date:  Indefinite   Send INR reminders to:  [facility]ending [facility] to [facility]    Indications    PE (pulmonary thromboembolism) (H) [I26.99]  Homozygous Factor V Leiden mutation (H) [D68.51]  Warfarin anticoagulation [Z79.01]             Comments:               Anticoagulation Care Providers       Provider Role Specialty Phone number    Ash Diaz MD Referring Internal Medicine [phone]

## 2024-10-07 ENCOUNTER — TELEPHONE (OUTPATIENT)
Dept: INTERNAL MEDICINE | Facility: CLINIC | Age: 39
End: 2024-10-07
Payer: COMMERCIAL

## 2024-10-07 DIAGNOSIS — Z79.01 WARFARIN ANTICOAGULATION: ICD-10-CM

## 2024-10-07 DIAGNOSIS — D68.51 HOMOZYGOUS FACTOR V LEIDEN MUTATION (H): ICD-10-CM

## 2024-10-07 DIAGNOSIS — I26.99 PE (PULMONARY THROMBOEMBOLISM) (H): ICD-10-CM

## 2024-10-07 RX ORDER — WARFARIN SODIUM 2.5 MG/1
TABLET ORAL
Qty: 115 TABLET | Refills: 0 | Status: SHIPPED | OUTPATIENT
Start: 2024-10-07

## 2024-10-07 NOTE — TELEPHONE ENCOUNTER
ANTICOAGULATION MANAGEMENT:  Medication Refill    Anticoagulation Summary  As of 9/27/2024      Warfarin maintenance plan:  2.5 mg (2.5 mg x 1) every Mon, Wed, Fri; 3.75 mg (2.5 mg x 1.5) all other days   Next INR check:  10/18/2024   Target end date:  Indefinite    Indications    PE (pulmonary thromboembolism) (H) [I26.99]  Homozygous Factor V Leiden mutation (H) [D68.51]  Warfarin anticoagulation [Z79.01]                 Anticoagulation Care Providers       Provider Role Specialty Phone number    Ash Diaz MD Referring Internal Medicine [phone]            Refill Criteria    Visit with referring provider/group: Meets criteria: visit within referring provider group in the last 15 months on 12/20/2023    [facility] referral last signed: 12/20/2023; within last year: Yes    Lab monitoring not exceeding 2 weeks overdue: Yes    Rajesh meets all criteria for refill. Rx instructions and quantity supplied updated to match patient's current dosing plan.  90 day supply with 0 refills granted per [facility] protocol     Patient informed refill approved, reminded to schedule office visit in December, patient verbalized understanding.    Hermila Gore, RN  Anticoagulation Clinic

## 2024-10-07 NOTE — TELEPHONE ENCOUNTER
Medication Question or Refill    Contacts       Contact Date/Time Type Contact Phone/Fax    10/07/2024 08:56 AM CDT Phone (Incoming) Rajesh Ordaz (Self) [phone] (M)            What medication are you calling about (include dose and sig)?: warfarin ANTICOAGULANT (COUMADIN) 2.5 MG tablet     Preferred Pharmacy:[address] [address]  Phone: [phone] Fax: [phone]        Controlled Substance Agreement on file:   CSA -- Patient Level:    CSA: None found at the patient level.       Who prescribed the medication?: previous pcp    Do you need a refill? Yes    When did you use the medication last? 10/6/2024    Patient offered an appointment? No    Do you have any questions or concerns?  No      Could we send this information to you in [facility] or would you prefer to receive a phone call?:   Patient would prefer a phone call   Okay to leave a detailed message?: Yes at Home number on file [phone] (home)

## 2024-10-16 ENCOUNTER — LAB (OUTPATIENT)
Dept: LAB | Facility: CLINIC | Age: 39
End: 2024-10-16
Payer: COMMERCIAL

## 2024-10-16 ENCOUNTER — ANTICOAGULATION THERAPY VISIT (OUTPATIENT)
Dept: ANTICOAGULATION | Facility: CLINIC | Age: 39
End: 2024-10-16

## 2024-10-16 DIAGNOSIS — D68.51 HOMOZYGOUS FACTOR V LEIDEN MUTATION (H): ICD-10-CM

## 2024-10-16 DIAGNOSIS — I26.99 PE (PULMONARY THROMBOEMBOLISM) (H): Primary | ICD-10-CM

## 2024-10-16 DIAGNOSIS — Z79.01 WARFARIN ANTICOAGULATION: ICD-10-CM

## 2024-10-16 DIAGNOSIS — I82.401 DEEP VEIN THROMBOSIS (DVT) OF RIGHT LOWER EXTREMITY, UNSPECIFIED CHRONICITY, UNSPECIFIED VEIN (H): ICD-10-CM

## 2024-10-16 DIAGNOSIS — I26.99 PE (PULMONARY THROMBOEMBOLISM) (H): ICD-10-CM

## 2024-10-16 LAB — INR BLD: 2.5 (ref 0.9–1.1)

## 2024-10-16 PROCEDURE — 36416 COLLJ CAPILLARY BLOOD SPEC: CPT

## 2024-10-16 PROCEDURE — 85610 PROTHROMBIN TIME: CPT

## 2024-10-16 NOTE — PROGRESS NOTES
ANTICOAGULATION MANAGEMENT     Rajesh Ordaz 39 year old male is on warfarin with therapeutic INR result. (Goal INR 2.0-3.0)    Recent labs: (last 7 days)     10/16/24  0857   INR 2.5*       ASSESSMENT     Source(s): Chart Review and Patient/Caregiver Call     Warfarin doses taken: Warfarin taken as instructed  Diet: No new diet changes identified  Medication/supplement changes: None noted  New illness, injury, or hospitalization: No  Signs or symptoms of bleeding or clotting: No  Previous result: Therapeutic last visit; previously outside of goal range  Additional findings: None       PLAN     Recommended plan for no diet, medication or health factor changes affecting INR     Dosing Instructions: Continue your current warfarin dose with next INR in 4 weeks       Summary  As of 10/16/2024      Full warfarin instructions:  2.5 mg every Mon, Wed, Fri; 3.75 mg all other days   Next INR check:  11/13/2024               Telephone call with Rajesh who verbalizes understanding and agrees to plan    Lab visit scheduled    Education provided: Please call back if any changes to your diet, medications or how you've been taking warfarin    Plan made per [facility] anticoagulation protocol    Marilynn Ramires RN  10/16/2024  Anticoagulation Clinic  [employer] for routing messages: main [facility] patient phone line: [phone]        _______________________________________________________________________     Anticoagulation Episode Summary       Current INR goal:  2.0-3.0   TTR:  89.6% (9.7 mo)   Target end date:  Indefinite   Send INR reminders to:  [facility]    Indications    PE (pulmonary thromboembolism) (H) [I26.99]  Homozygous Factor V Leiden mutation (H) [D68.51]  Warfarin anticoagulation [Z79.01]             Comments:               Anticoagulation Care Providers       Provider Role Specialty Phone number    Ash Diaz MD Referring Internal Medicine [phone]

## 2024-11-13 ENCOUNTER — DOCUMENTATION ONLY (OUTPATIENT)
Dept: ANTICOAGULATION | Facility: CLINIC | Age: 39
End: 2024-11-13

## 2024-11-13 ENCOUNTER — LAB (OUTPATIENT)
Dept: LAB | Facility: CLINIC | Age: 39
End: 2024-11-13
Payer: COMMERCIAL

## 2024-11-13 ENCOUNTER — ANTICOAGULATION THERAPY VISIT (OUTPATIENT)
Dept: ANTICOAGULATION | Facility: CLINIC | Age: 39
End: 2024-11-13

## 2024-11-13 DIAGNOSIS — Z79.01 WARFARIN ANTICOAGULATION: ICD-10-CM

## 2024-11-13 DIAGNOSIS — I26.99 PE (PULMONARY THROMBOEMBOLISM) (H): ICD-10-CM

## 2024-11-13 DIAGNOSIS — I82.401 DEEP VEIN THROMBOSIS (DVT) OF RIGHT LOWER EXTREMITY, UNSPECIFIED CHRONICITY, UNSPECIFIED VEIN (H): ICD-10-CM

## 2024-11-13 DIAGNOSIS — I82.409 DVT (DEEP VENOUS THROMBOSIS) (H): ICD-10-CM

## 2024-11-13 DIAGNOSIS — D68.51 HOMOZYGOUS FACTOR V LEIDEN MUTATION (H): ICD-10-CM

## 2024-11-13 DIAGNOSIS — I26.99 PE (PULMONARY THROMBOEMBOLISM) (H): Primary | ICD-10-CM

## 2024-11-13 LAB — INR BLD: 2.1 (ref 0.9–1.1)

## 2024-11-13 PROCEDURE — 36416 COLLJ CAPILLARY BLOOD SPEC: CPT

## 2024-11-13 PROCEDURE — 85610 PROTHROMBIN TIME: CPT

## 2024-11-13 NOTE — PROGRESS NOTES
Call to make a follow-up appointment with your GI doctor as well as your primary care doctor, return to the emergency department new or worsening symptoms including worsening abdominal pain, high fever, persistent vomiting   No changes to referral.  Standing INR order updated per protocol.  Stacia Marrufo RN

## 2024-11-13 NOTE — PROGRESS NOTES
ANTICOAGULATION CLINIC REFERRAL RENEWAL REQUEST       An annual renewal order is required for all patients referred to [facility] Anticoagulation Clinic.?  Please review and sign the pended referral order for Rajesh Ordaz.       ANTICOAGULATION SUMMARY      Warfarin indication(s)   PE and Homozygous Factor V Leiden    Mechanical heart valve present?  NO       Current goal range   INR: 2.0-3.0     Goal appropriate for indication? Goal INR 2-3, standard for indication(s) above     Time in Therapeutic Range (TTR)  (Goal > 60%) 90.5%       Office visit with referring provider's group within last year yes on 12/20/23.  Reminded patient that he is due in December 2024 for his physical.       Stacia Marrufo RN  [facility] Anticoagulation [facility]

## 2024-11-13 NOTE — PROGRESS NOTES
ANTICOAGULATION MANAGEMENT     Rajesh Ordaz 39 year old male is on warfarin with therapeutic INR result. (Goal INR 2.0-3.0)    Recent labs: (last 7 days)     11/13/24  0849   INR 2.1*       ASSESSMENT     Source(s): Chart Review and Patient/Caregiver Call     Warfarin doses taken: Warfarin taken as instructed  Diet: No new diet changes identified  Medication/supplement changes: None noted  New illness, injury, or hospitalization: No  Signs or symptoms of bleeding or clotting: No  Previous result: Therapeutic last 2(+) visits  Additional findings: reminded patient that he is due for his annual physical in December.       PLAN     Recommended plan for no diet, medication or health factor changes affecting INR     Dosing Instructions: Continue your current warfarin dose with next INR in 5 weeks       Summary  As of 11/13/2024      Full warfarin instructions:  2.5 mg every Mon, Wed, Fri; 3.75 mg all other days   Next INR check:  12/18/2024               Telephone call with Rajesh who agrees to plan and repeated back plan correctly    Patient offered & declined to schedule next visit.  Patient plans to call and set up his annual physical and also do lab appointment at the same time.    Education provided: Please call back if any changes to your diet, medications or how you've been taking warfarin    Plan made per [facility] anticoagulation protocol    Stacia Marrufo RN  11/13/2024  Anticoagulation Clinic  [employer] for routing messages: main [employer] [facility] patient phone line: [phone]        _______________________________________________________________________     Anticoagulation Episode Summary       Current INR goal:  2.0-3.0   TTR:  90.5% (10.6 mo)   Target end date:  Indefinite   Send INR reminders to:  [facility]    Indications    PE (pulmonary thromboembolism) (H) [I26.99]  Homozygous Factor V Leiden mutation (H) [D68.51]  Warfarin anticoagulation [Z79.01]             Comments:  --              Anticoagulation Care Providers       Provider Role Specialty Phone number    Ash Diaz MD Referring Internal Medicine [phone]

## 2024-11-15 NOTE — TELEPHONE ENCOUNTER
"SAIGE-PROCEDURAL ANTICOAGULATION  MANAGEMENT    ASSESSMENT     Warfarin interruption plan for vasectomy on 4/29.    Indication for Anticoagulation: DVT, PE, and Homozygous Factor V Leiden    DVT 2013 complicated by PE during same admission      Saige-Procedure Risk stratification for thromboembolism: high (2022 Chest guidelines)    HIGH RISK: 2022 CHEST Perioperative Management guidelines suggests bridging patients at high risk for thromboembolism when interrupting warfarin for an elective surgery/procedure    RECOMMENDATION     Pre-Procedure:  Hold warfarin for 5 days, until after procedure starting: Wednesday, April 24   Enoxaparin (Lovenox) 120 mg subq Q 12 hrs (1 mg/kg Q 12 hrs for CrCl >= 60 ml/min and BMI <= 40 kg/m2)   Start enoxaparin: Friday, April 26 in AM  Last dose of enoxaparin prior to procedure: Sunday, April 28 in AM  (~24 hours prior to procedure)    Post-Procedure:  Resume warfarin dose if okay with provider doing procedure on night of procedure, Monday, April 29 PM: 7.5 mg (3 tabs) x1, then resume previous regimen  Resume enoxaparin (Lovenox) ~ 24 hrs post procedure when okay with provider doing procedure. Continue until INR >= 2.0  Recheck INR 5-7 days after resuming warfarin   ?     Plan routed to referring provider for approval  ?   Dorcas Gibson, [facility]    SUBJECTIVE/OBJECTIVE     Rajesh Walkerkita, a 38 year old male    Goal INR Range: 2.0-3.0     Patient bridged in past: Yes, with subtherapeutic INR prior to warfarin initation    Wt Readings from Last 3 Encounters:   12/20/23 121.1 kg (267 lb)   01/15/14 119.3 kg (263 lb 0.1 oz)   10/16/13 116.9 kg (257 lb 11.5 oz)      Ideal body weight: 79.9 kg (176 lb 2.4 oz)  Adjusted ideal body weight: 96.4 kg (212 lb 7.8 oz)     Estimated body mass index is 35.23 kg/m  as calculated from the following:    Height as of 12/20/23: 1.854 m (6' 1\").    Weight as of 12/20/23: 121.1 kg (267 lb).    Lab Results   Component Value Date    INR 2.9 (H) 04/05/2024 "    INR 2.2 (H) 03/01/2024    INR 2.1 (H) 02/02/2024     Lab Results   Component Value Date    HGB 14.9 12/20/2023    HCT 43.0 12/20/2023     12/20/2023     Lab Results   Component Value Date    CR 1.11 12/20/2023    CR 1.03 03/20/2013    CR 0.87 02/04/2013     Estimated Creatinine Clearance: 125.4 mL/min (based on SCr of 1.11 mg/dL).     Reji Nieves(Resident)

## 2024-12-19 ENCOUNTER — LAB (OUTPATIENT)
Dept: LAB | Facility: CLINIC | Age: 39
End: 2024-12-19
Payer: COMMERCIAL

## 2024-12-19 ENCOUNTER — ANTICOAGULATION THERAPY VISIT (OUTPATIENT)
Dept: ANTICOAGULATION | Facility: CLINIC | Age: 39
End: 2024-12-19

## 2024-12-19 DIAGNOSIS — Z79.01 WARFARIN ANTICOAGULATION: ICD-10-CM

## 2024-12-19 DIAGNOSIS — I26.99 PE (PULMONARY THROMBOEMBOLISM) (H): Primary | ICD-10-CM

## 2024-12-19 DIAGNOSIS — D68.51 HOMOZYGOUS FACTOR V LEIDEN MUTATION (H): ICD-10-CM

## 2024-12-19 DIAGNOSIS — I26.99 PE (PULMONARY THROMBOEMBOLISM) (H): ICD-10-CM

## 2024-12-19 LAB — INR BLD: 2.3 (ref 0.9–1.1)

## 2024-12-19 NOTE — PROGRESS NOTES
ANTICOAGULATION MANAGEMENT     Rajesh Ordaz 39 year old male is on warfarin with therapeutic INR result. (Goal INR 2.0-3.0)    Recent labs: (last 7 days)     12/19/24  0855   INR 2.3*       ASSESSMENT     Warfarin Lab Questionnaire    Warfarin Doses Last 7 Days      12/19/2024     8:50 AM   Dose in Tablet or Mg   TAB or MG? milligram (mg)     Pt Rptd Dose SUNDAY MONDAY TUESDAY WED THURS FRIDAY SATURDAY 12/19/2024   8:50 AM 3.75 2.5 3.75 2.5 3.75 2.5 3.75         12/19/2024   Warfarin Lab Questionnaire   Missed doses within past 14 days? No   Changes in diet or alcohol within past 14 days? No   Medication changes since last result? No   Injuries or illness since last result? No   New shortness of breath, severe headaches or sudden changes in vision since last result? No   Abnormal bleeding since last result? No   Upcoming surgery, procedure? No   Best number to call with results? [phone]     Previous result: Therapeutic last 2(+) visits  Additional findings:  wellness exam scheduled on 1/2/2025       PLAN     Recommended plan for no diet, medication or health factor changes affecting INR     Dosing Instructions: Continue your current warfarin dose with next INR in 6 weeks       Summary  As of 12/19/2024      Full warfarin instructions:  2.5 mg every Mon, Wed, Fri; 3.75 mg all other days   Next INR check:  1/30/2025               Telephone call with Rajesh who verbalizes understanding and agrees to plan    Lab visit scheduled    Education provided: None required    Plan made per [facility] anticoagulation protocol    Hermila Gore RN  12/19/2024  Anticoagulation Clinic  [facility] for routing messages: main [facility] patient phone line: [phone]        _______________________________________________________________________     Anticoagulation Episode Summary       Current INR goal:  2.0-3.0   TTR:  91.5% (11.8 mo)   Target end date:  Indefinite   Send INR reminders to:  [facility]ON[facility]LS[facility]  [facility]    Indications    PE (pulmonary thromboembolism) (H) [I26.99]  Homozygous Factor V Leiden mutation (H) [D68.51]  Warfarin anticoagulation [Z79.01]             Comments:  --             Anticoagulation Care Providers       Provider Role Specialty Phone number    Ash Diaz MD Referring Internal Medicine [phone]

## 2024-12-30 ENCOUNTER — TELEPHONE (OUTPATIENT)
Dept: INTERNAL MEDICINE | Facility: CLINIC | Age: 39
End: 2024-12-30
Payer: COMMERCIAL

## 2024-12-30 DIAGNOSIS — Z79.01 WARFARIN ANTICOAGULATION: ICD-10-CM

## 2024-12-30 DIAGNOSIS — D68.51 HOMOZYGOUS FACTOR V LEIDEN MUTATION (H): ICD-10-CM

## 2024-12-30 DIAGNOSIS — I26.99 PE (PULMONARY THROMBOEMBOLISM) (H): Primary | ICD-10-CM

## 2024-12-30 RX ORDER — WARFARIN SODIUM 2.5 MG/1
TABLET ORAL
Qty: 120 TABLET | Refills: 1 | Status: SHIPPED | OUTPATIENT
Start: 2024-12-30

## 2024-12-30 NOTE — TELEPHONE ENCOUNTER
Medication Question or Refill    Contacts       Contact Date/Time Type Contact Phone/Fax    12/30/2024 10:27 AM CST Phone (Incoming) Rajesh Ordaz (Self) [phone] ()            What medication are you calling about (include dose and sig)?: fariha    Preferred Pharmacy:[address] [address]  Phone: [phone] Fax: [phone]          Controlled Substance Agreement on file:   CSA -- Patient Level:    CSA: None found at the patient level.       Who prescribed the medication?: pcp    Do you need a refill? Yes    When did you use the medication last? 12/29/2024    Patient offered an appointment? No    Do you have any questions or concerns?  No      Could we send this information to you in [facility] or would you prefer to receive a phone call?:   Patient would prefer a phone call   Okay to leave a detailed message?: Yes at Home number on file [phone] (home)

## 2024-12-30 NOTE — TELEPHONE ENCOUNTER
ANTICOAGULATION MANAGEMENT:  Medication Refill    Anticoagulation Summary  As of 12/19/2024      Warfarin maintenance plan:  2.5 mg (2.5 mg x 1) every Mon, Wed, Fri; 3.75 mg (2.5 mg x 1.5) all other days   Next INR check:  1/30/2025   Target end date:  Indefinite    Indications    PE (pulmonary thromboembolism) (H) [I26.99]  Homozygous Factor V Leiden mutation (H) [D68.51]  Warfarin anticoagulation [Z79.01]                 Anticoagulation Care Providers       Provider Role Specialty Phone number    Ash Diaz MD Referring Internal Medicine [phone]            Refill Criteria    Visit with referring provider/group: Meets criteria: visit within referring provider group in the last 15 months on 12/20/23 (scheduled for upcoming visit on 1/2/5)    [facility] referral last signed: 11/13/2024; within last year:  Yes    Lab monitoring not exceeding 2 weeks overdue: Yes    Rajesh meets all criteria for refill. Rx instructions and quantity supplied updated to match patient's current dosing plan. Warfarin 90 day supply with 1 refill granted per [facility] protocol     Marilynn Ramires RN  Anticoagulation Clinic

## 2025-01-02 ENCOUNTER — OFFICE VISIT (OUTPATIENT)
Dept: INTERNAL MEDICINE | Facility: CLINIC | Age: 40
End: 2025-01-02
Payer: COMMERCIAL

## 2025-01-02 VITALS
BODY MASS INDEX: 32.13 KG/M2 | RESPIRATION RATE: 16 BRPM | HEIGHT: 73 IN | SYSTOLIC BLOOD PRESSURE: 112 MMHG | DIASTOLIC BLOOD PRESSURE: 78 MMHG | TEMPERATURE: 98.1 F | HEART RATE: 97 BPM | WEIGHT: 242.4 LBS | OXYGEN SATURATION: 100 %

## 2025-01-02 DIAGNOSIS — Z79.01 WARFARIN ANTICOAGULATION: ICD-10-CM

## 2025-01-02 DIAGNOSIS — Z00.00 ANNUAL PHYSICAL EXAM: Primary | ICD-10-CM

## 2025-01-02 DIAGNOSIS — D68.51 HOMOZYGOUS FACTOR V LEIDEN MUTATION (H): ICD-10-CM

## 2025-01-02 DIAGNOSIS — N52.9 ERECTILE DYSFUNCTION, UNSPECIFIED ERECTILE DYSFUNCTION TYPE: ICD-10-CM

## 2025-01-02 RX ORDER — SILDENAFIL 50 MG/1
50 TABLET, FILM COATED ORAL DAILY PRN
Qty: 12 TABLET | Refills: 1 | Status: SHIPPED | OUTPATIENT
Start: 2025-01-02

## 2025-01-02 RX ORDER — LISDEXAMFETAMINE DIMESYLATE 50 MG/1
CAPSULE ORAL
COMMUNITY
Start: 2024-11-27

## 2025-01-02 SDOH — HEALTH STABILITY: PHYSICAL HEALTH: ON AVERAGE, HOW MANY DAYS PER WEEK DO YOU ENGAGE IN MODERATE TO STRENUOUS EXERCISE (LIKE A BRISK WALK)?: 1 DAY

## 2025-01-02 ASSESSMENT — SOCIAL DETERMINANTS OF HEALTH (SDOH): HOW OFTEN DO YOU GET TOGETHER WITH FRIENDS OR RELATIVES?: TWICE A WEEK

## 2025-01-02 NOTE — PROGRESS NOTES
"Preventive Care Visit  [facility]  Ash Diaz MD, Internal Medicine  Jan 2, 2025      Assessment & Plan     Annual physical exam  At this time, patient does have a relatively unremarkable physical examination. His blood pressure was noted to be at an acceptable level. We did visit discussing appropriate dietary lifestyle modifications to help keep his weight and blood pressure under better control. Lab work is up to date. All health maintenance items were addressed.     Homozygous Factor V Leiden mutation (H) and Warfarin anticoagulation  Chronic condition.  Followed in the [facility] clinic.  Will continue current warfarin regimen.    Erectile dysfunction, unspecified erectile dysfunction type  At this time, patient is expressing interest in trying medication to help with his issues with erectile dysfunction.  A prescription for sildenafil 50 mg by mouth once per day as needed for sexual activity was submitted to his pharmacy.  Side effects of phosphodiesterase inhibitors were reviewed.  We will monitor his response.  - sildenafil (VIAGRA) 50 MG tablet; Take 1 tablet (50 mg) by mouth daily as needed (Sexual activity).    Patient has been advised of split billing requirements and indicates understanding: Yes        BMI  Estimated body mass index is 31.98 kg/m  as calculated from the following:    Height as of this encounter: 1.854 m (6' 1\").    Weight as of this encounter: 110 kg (242 lb 6.4 oz).   Weight management plan: Discussed healthy diet and exercise guidelines    Counseling  Appropriate preventive services were addressed with this patient via screening, questionnaire, or discussion as appropriate for fall prevention, nutrition, physical activity, Tobacco-use cessation, social engagement, weight loss and cognition.  Checklist reviewing preventive services available has been given to the patient.  Reviewed patient's diet, addressing concerns and/or questions.   He is at risk for " lack of exercise and has been provided with information to increase physical activity for the benefit of his well-being.       See Patient Instructions    Samira Mena is a 39 year old, presenting for the following:  Physical (fasting)        1/2/2025     9:00 AM   Additional Questions   Roomed by sparkle   Accompanied by self         1/2/2025     9:00 AM   Patient Reported Additional Medications   Patient reports taking the following new medications none          Patient is a 39-year-old  male who presents to the clinic for his annual physical examination.  He does have a history of ADHD as well as factor V Leiden mutation.  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  He is currently on chronic anticoagulation.  Patient does take warfarin 2.5 mg every day of the week, but he does state that every 2 weeks he will take 3 days of 5 mg.  He is followed in the [facility] clinic.  Patient does report some concerns about erectile difficulties.  He reports that over the past few years he has had progressively worsening difficulties with maintaining erections.  Patient states that his libido has remained unchanged, and he does have erections upon awakening in the morning. Patient is expressing interest in trying medication to help with this issue.        Health Care Directive  Patient does not have a Health Care Directive: Discussed advance care planning with patient; however, patient declined at this time.      1/2/2025   General Health   How would you rate your overall physical health? Good   Feel stress (tense, anxious, or unable to sleep) Only a little   (!) STRESS CONCERN      1/2/2025   Nutrition   Three or more servings of calcium each day? Yes   Diet: Regular (no restrictions)   How many servings of fruit and vegetables per day? (!) 2-3   How many sweetened beverages each day? 0-1         1/2/2025   Exercise   Days per week of moderate/strenous exercise 1 day   (!) EXERCISE CONCERN       1/2/2025   Social Factors   Frequency of gathering with friends or relatives Twice a week   Worry food won't last until get money to buy more No   Food not last or not have enough money for food? No   Do you have housing? (Housing is defined as stable permanent housing and does not include staying ouside in a car, in a tent, in an abandoned building, in an overnight shelter, or couch-surfing.) Yes   Are you worried about losing your housing? No   Lack of transportation? No   Unable to get utilities (heat,electricity)? No         1/2/2025   Dental   Dentist two times every year? Yes         1/2/2025   TB Screening   Were you born outside of the [place]? No         Today's PHQ-2 Score:       1/2/2025     8:59 AM   PHQ-2 ( 1999 [employer])   Q1: Little interest or pleasure in doing things 0   Q2: Feeling down, depressed or hopeless 0   PHQ-2 Score 0    Q1: Little interest or pleasure in doing things Not at all   Q2: Feeling down, depressed or hopeless Not at all   PHQ-2 Score 0       Patient-reported           1/2/2025   Substance Use   Alcohol more than 3/day or more than 7/wk No   Do you use any other substances recreationally? No     Social History     Tobacco Use    Smoking status: Never    Smokeless tobacco: Never   Substance Use Topics    Alcohol use: Yes     Comment: occ    Drug use: No           1/2/2025   STI Screening   New sexual partner(s) since last STI/HIV test? No       Reviewed and updated as needed this visit by Provider                    Labs reviewed in EPIC      Review of Systems  CONSTITUTIONAL: NEGATIVE for fever, chills, change in weight  INTEGUMENTARY/SKIN: NEGATIVE for worrisome rashes, moles or lesions  ENT/MOUTH: NEGATIVE for ear, mouth and throat problems  RESP: NEGATIVE for significant cough or SOB  CV: NEGATIVE for chest pain, palpitations or peripheral edema  GI: NEGATIVE for nausea, abdominal pain, heartburn, or change in bowel habits  : NEGATIVE for frequency, dysuria, or  "hematuria  MUSCULOSKELETAL: NEGATIVE for significant arthralgias or myalgia  NEURO: NEGATIVE for weakness, dizziness or paresthesias     Objective    Exam  /78 (BP Location: Right arm, Patient Position: Sitting, Cuff Size: Adult Large)   Pulse 97   Temp 98.1  F (36.7  C) (Oral)   Resp 16   Ht 1.854 m (6' 1\")   Wt 110 kg (242 lb 6.4 oz)   SpO2 100%   BMI 31.98 kg/m     Estimated body mass index is 31.98 kg/m  as calculated from the following:    Height as of this encounter: 1.854 m (6' 1\").    Weight as of this encounter: 110 kg (242 lb 6.4 oz).    Physical Exam  Vitals reviewed.   Constitutional:       Appearance: Normal appearance.   HENT:      Head: Normocephalic and atraumatic.      Right Ear: Tympanic membrane, ear canal and external ear normal.      Left Ear: Tympanic membrane, ear canal and external ear normal.      Mouth/Throat:      Mouth: Mucous membranes are moist.      Pharynx: Oropharynx is clear.   Eyes:      Extraocular Movements: Extraocular movements intact.      Conjunctiva/sclera: Conjunctivae normal.      Pupils: Pupils are equal, round, and reactive to light.   Cardiovascular:      Rate and Rhythm: Normal rate and regular rhythm.      Pulses: Normal pulses.      Heart sounds: Normal heart sounds.   Pulmonary:      Effort: Pulmonary effort is normal.      Breath sounds: Normal breath sounds.   Abdominal:      General: Bowel sounds are normal.      Palpations: Abdomen is soft.   Musculoskeletal:      Cervical back: Normal range of motion and neck supple.   Skin:     General: Skin is warm and dry.      Capillary Refill: Capillary refill takes less than 2 seconds.   Neurological:      General: No focal deficit present.      Mental Status: He is alert and oriented to person, place, and time.           Signed Electronically by: Ash Diaz MD    "

## 2025-01-30 ENCOUNTER — ANTICOAGULATION THERAPY VISIT (OUTPATIENT)
Dept: ANTICOAGULATION | Facility: CLINIC | Age: 40
End: 2025-01-30

## 2025-01-30 ENCOUNTER — LAB (OUTPATIENT)
Dept: LAB | Facility: CLINIC | Age: 40
End: 2025-01-30
Payer: COMMERCIAL

## 2025-01-30 DIAGNOSIS — I26.99 PE (PULMONARY THROMBOEMBOLISM) (H): ICD-10-CM

## 2025-01-30 DIAGNOSIS — Z79.01 WARFARIN ANTICOAGULATION: ICD-10-CM

## 2025-01-30 DIAGNOSIS — D68.51 HOMOZYGOUS FACTOR V LEIDEN MUTATION: ICD-10-CM

## 2025-01-30 DIAGNOSIS — I26.99 PE (PULMONARY THROMBOEMBOLISM) (H): Primary | ICD-10-CM

## 2025-01-30 LAB — INR BLD: 2.5 (ref 0.9–1.1)

## 2025-01-30 NOTE — PROGRESS NOTES
ANTICOAGULATION MANAGEMENT     Rajesh Ordaz 39 year old male is on warfarin with therapeutic INR result. (Goal INR 2.0-3.0)    Recent labs: (last 7 days)     01/30/25  0849   INR 2.5*       ASSESSMENT     Source(s): Chart Review  Previous INR was Therapeutic last 2(+) visits  Sildenafil started at 1/2/25 OV, no interaction with warfarin per microm[facility]ex       PLAN     Recommended plan for no diet, medication or health factor changes affecting INR     Dosing Instructions: Continue your current warfarin dose with next INR in 6 weeks       Summary  As of 1/30/2025      Full warfarin instructions:  2.5 mg every Mon, Wed, Fri; 3.75 mg all other days   Next INR check:  3/13/2025               Detailed voice message left for Rajesh with dosing instructions and follow up date.     Contact [phone] to schedule and with any changes, questions or concerns.     Education provided: Please call back if any changes to your diet, medications or how you've been taking warfarin    Plan made per [facility] anticoagulation protocol    Giulia Capps RN  1/30/2025  Anticoagulation Clinic  [employer] for routing messages: main [facility] patient phone line: [phone]        _______________________________________________________________________     Anticoagulation Episode Summary       Current INR goal:  2.0-3.0   TTR:  91.7% (1 y)   Target end date:  Indefinite   [facility]    Indications    PE (pulmonary thromboembolism) (H) [I26.99]  Homozygous Factor V Leiden mutation [D68.51]  Warfarin anticoagulation [Z79.01]             Comments:  --             Anticoagulation Care Providers       Provider Role Specialty Phone number    Ash Diaz MD Referring Internal Medicine [phone]

## 2025-03-20 ENCOUNTER — ANTICOAGULATION THERAPY VISIT (OUTPATIENT)
Dept: ANTICOAGULATION | Facility: CLINIC | Age: 40
End: 2025-03-20

## 2025-03-20 ENCOUNTER — LAB (OUTPATIENT)
Dept: LAB | Facility: CLINIC | Age: 40
End: 2025-03-20
Payer: COMMERCIAL

## 2025-03-20 DIAGNOSIS — Z79.01 WARFARIN ANTICOAGULATION: ICD-10-CM

## 2025-03-20 DIAGNOSIS — I26.99 PE (PULMONARY THROMBOEMBOLISM) (H): Primary | ICD-10-CM

## 2025-03-20 DIAGNOSIS — I26.99 PE (PULMONARY THROMBOEMBOLISM) (H): ICD-10-CM

## 2025-03-20 DIAGNOSIS — D68.51 HOMOZYGOUS FACTOR V LEIDEN MUTATION: ICD-10-CM

## 2025-03-20 LAB — INR BLD: 2.1 (ref 0.9–1.1)

## 2025-03-20 NOTE — PROGRESS NOTES
ANTICOAGULATION MANAGEMENT     Rajesh Ordaz 39 year old male is on warfarin with therapeutic INR result. (Goal INR 2.0-3.0)    Recent labs: (last 7 days)     03/20/25  0852   INR 2.1*       ASSESSMENT     Source(s): Chart Review and Patient/Caregiver Call     Warfarin doses taken: Warfarin taken as instructed  Diet: No new diet changes identified  Medication/supplement changes: None noted  New illness, injury, or hospitalization: No  Signs or symptoms of bleeding or clotting: Yes: patient reports intermittent bleeding hemorrhoids; however, this is not a new issue. Patient reports the issue is at baseline, has not felt the need to seek medical attention.   Previous result: Therapeutic last 2(+) visits  Additional findings: None       PLAN     Recommended plan for no diet, medication or health factor changes affecting INR     Dosing Instructions: Continue your current warfarin dose with next INR in 6 weeks       Summary  As of 3/20/2025      Full warfarin instructions:  2.5 mg every Mon, Wed, Fri; 3.75 mg all other days   Next INR check:  5/1/2025               Telephone call with Rajesh who verbalizes understanding and agrees to plan    Lab visit scheduled    Education provided: Symptom monitoring: monitoring for bleeding signs and symptoms and staying active, staying well hydrated and eating fiber rich foods    Plan made per [facility] anticoagulation protocol    Hermila Gore, RN  3/20/2025  Anticoagulation Clinic  [employer] for routing messages: main [facility] patient phone line: [phone]        _______________________________________________________________________     Anticoagulation Episode Summary       Current INR goal:  2.0-3.0   TTR:  91.7% (1 y)   Target end date:  Indefinite   Send INR reminders to:  [facility]    Indications    PE (pulmonary thromboembolism) (H) [I26.99]  Homozygous Factor V Leiden mutation [D68.51]  Warfarin anticoagulation [Z79.01]              Comments:  --             Anticoagulation Care Providers       Provider Role Specialty Phone number    Ash Diaz MD Referring Internal Medicine [phone]

## 2025-05-01 ENCOUNTER — LAB (OUTPATIENT)
Dept: LAB | Facility: CLINIC | Age: 40
End: 2025-05-01
Payer: COMMERCIAL

## 2025-05-01 ENCOUNTER — ANTICOAGULATION THERAPY VISIT (OUTPATIENT)
Dept: ANTICOAGULATION | Facility: CLINIC | Age: 40
End: 2025-05-01

## 2025-05-01 DIAGNOSIS — D68.51 HOMOZYGOUS FACTOR V LEIDEN MUTATION: ICD-10-CM

## 2025-05-01 DIAGNOSIS — I26.99 PE (PULMONARY THROMBOEMBOLISM) (H): Primary | ICD-10-CM

## 2025-05-01 DIAGNOSIS — I26.99 PE (PULMONARY THROMBOEMBOLISM) (H): ICD-10-CM

## 2025-05-01 DIAGNOSIS — Z79.01 WARFARIN ANTICOAGULATION: ICD-10-CM

## 2025-05-01 LAB — INR BLD: 3 (ref 0.9–1.1)

## 2025-05-01 NOTE — PROGRESS NOTES
ANTICOAGULATION MANAGEMENT     Rajesh Ordaz 40 year old male is on warfarin with therapeutic INR result. (Goal INR 2.0-3.0)    Recent labs: (last 7 days)     05/01/25  0820   INR 3.0*       ASSESSMENT     Source(s): Chart Review and Patient/Caregiver Call     Warfarin doses taken: Warfarin taken differently, but did not change total weekly dose - technically patient missed one dose, but made it up the next day - best guess entered on [facility] tracker  Diet: No new diet changes identified - patient mentioned having trouble keeping his greens as consistent as he should  Medication/supplement changes: None noted  New illness, injury, or hospitalization: No  Signs or symptoms of bleeding or clotting: No  Previous result: Therapeutic last 2(+) visits  Additional findings: None       PLAN     Recommended plan for no diet, medication or health factor changes affecting INR     Dosing Instructions: Continue your current warfarin dose with next INR in 4 weeks       Summary  As of 5/1/2025      Full warfarin instructions:  2.5 mg every Mon, Wed, Fri; 3.75 mg all other days   Next INR check:  5/28/2025               Telephone call with Rajesh who verbalizes understanding and agrees to plan    Lab visit scheduled    Education provided: Please call back if any changes to your diet, medications or how you've been taking warfarin  Symptom monitoring: monitoring for bleeding signs and symptoms and monitoring for clotting signs and symptoms    Plan made per [facility] anticoagulation protocol    Anabel Dahl, RN  5/1/2025  Anticoagulation Clinic  [facility] for routing messages: main [facility]  [facility] patient phone line: [phone]        _______________________________________________________________________     Anticoagulation Episode Summary       Current INR goal:  2.0-3.0   TTR:  91.7% (1 y)   Target end date:  Indefinite   Send INR reminders to:  [facility]    Indications    PE (pulmonary  thromboembolism) (H) [I26.99]  Homozygous Factor V Leiden mutation [D68.51]  Warfarin anticoagulation [Z79.01]             Comments:  --             Anticoagulation Care Providers       Provider Role Specialty Phone number    Ash Diza MD Referring Internal Medicine [phone]

## 2025-06-12 ENCOUNTER — TELEPHONE (OUTPATIENT)
Dept: INTERNAL MEDICINE | Facility: CLINIC | Age: 40
End: 2025-06-12

## 2025-06-12 ENCOUNTER — RESULTS FOLLOW-UP (OUTPATIENT)
Dept: ANTICOAGULATION | Facility: CLINIC | Age: 40
End: 2025-06-12

## 2025-06-12 ENCOUNTER — ANTICOAGULATION THERAPY VISIT (OUTPATIENT)
Dept: ANTICOAGULATION | Facility: CLINIC | Age: 40
End: 2025-06-12

## 2025-06-12 ENCOUNTER — LAB (OUTPATIENT)
Dept: LAB | Facility: CLINIC | Age: 40
End: 2025-06-12
Payer: COMMERCIAL

## 2025-06-12 DIAGNOSIS — D68.51 HOMOZYGOUS FACTOR V LEIDEN MUTATION: ICD-10-CM

## 2025-06-12 DIAGNOSIS — Z79.01 WARFARIN ANTICOAGULATION: ICD-10-CM

## 2025-06-12 DIAGNOSIS — I26.99 PE (PULMONARY THROMBOEMBOLISM) (H): ICD-10-CM

## 2025-06-12 DIAGNOSIS — I26.99 PE (PULMONARY THROMBOEMBOLISM) (H): Primary | ICD-10-CM

## 2025-06-12 LAB — INR BLD: 3.6 (ref 0.9–1.1)

## 2025-06-12 RX ORDER — WARFARIN SODIUM 2.5 MG/1
TABLET ORAL
Qty: 120 TABLET | Refills: 1 | Status: SHIPPED | OUTPATIENT
Start: 2025-06-12

## 2025-06-12 NOTE — PROGRESS NOTES
ANTICOAGULATION MANAGEMENT     Rajesh Ordaz 40 year old male is on warfarin with supratherapeutic INR result. (Goal INR 2.0-3.0)    Recent labs: (last 7 days)     06/12/25  0825   INR 3.6*       ASSESSMENT     Source(s): Chart Review  Previous INR was Therapeutic last 2(+) visits  Medication, diet, health changes since last INR chart reviewed; none identified         PLAN     Unable to reach Rajesh today.    Left message for patient to call anticoagulation clinic to discuss result.    Follow up required to confirm warfarin dose taken and assess for changes    Stacia Marrufo RN  6/12/2025  Anticoagulation Clinic  [facility] for routing messages: main [facility] patient phone line: [phone]

## 2025-06-12 NOTE — PROGRESS NOTES
ANTICOAGULATION MANAGEMENT     Rajesh Ordaz 40 year old male is on warfarin with supratherapeutic INR result. (Goal INR 2.0-3.0)    Recent labs: (last 7 days)     06/12/25  0825   INR 3.6*       ASSESSMENT     Source(s): Chart Review and Patient/Caregiver Call     Warfarin doses taken: Warfarin taken as instructed  Diet: Decreased greens/vitamin K in diet; plans to resume previous intake  Medication/supplement changes: None noted  New illness, injury, or hospitalization: No  Signs or symptoms of bleeding or clotting: No  Previous result: Therapeutic last 2(+) visits  Additional findings: None       PLAN     Recommended plan for temporary change(s) affecting INR     Dosing Instructions: hold dose then continue your current warfarin dose with next INR in 2 weeks       Summary  As of 6/12/2025      Full warfarin instructions:  6/12: Hold; Otherwise 2.5 mg every Mon, Wed, Fri; 3.75 mg all other days   Next INR check:  6/26/2025               Telephone call with Rajesh who agrees to plan and repeated back plan correctly    Lab visit scheduled    Education provided: Please call back if any changes to your diet, medications or how you've been taking warfarin    Plan made per [facility] anticoagulation protocol    Stacia Marrufo RN  6/12/2025  Anticoagulation Clinic  [employer] for routing messages: main [facility] patient phone line: [phone]        _______________________________________________________________________     Anticoagulation Episode Summary       Current INR goal:  2.0-3.0   TTR:  80.2% (1 y)   Target end date:  Indefinite   Send INR reminders to:  [facility]    Indications    PE (pulmonary thromboembolism) (H) [I26.99]  Homozygous Factor V Leiden mutation [D68.51]  Warfarin anticoagulation [Z79.01]             Comments:  --             Anticoagulation Care Providers       Provider Role Specialty Phone number    Ash Diaz MD Referring Internal Medicine  Physical Therapy                 Therapy Communication Note    Patient Name: Jordon Waddell  MRN: [id number]  Today's Date: 6/28/2024     Discipline: Physical Therapy    Missed Visit Reason:      Missed Time: Attempt    Comment: pt off floor at time of attempt     [phone]

## 2025-06-12 NOTE — TELEPHONE ENCOUNTER
ANTICOAGULATION MANAGEMENT:  Medication Refill    Anticoagulation Summary  As of 6/12/2025      Warfarin maintenance plan:  2.5 mg (2.5 mg x 1) every Mon, Wed, Fri; 3.75 mg (2.5 mg x 1.5) all other days   Next INR check:  6/26/2025   Target end date:  Indefinite    Indications    PE (pulmonary thromboembolism) (H) [I26.99]  Homozygous Factor V Leiden mutation [D68.51]  Warfarin anticoagulation [Z79.01]                 Anticoagulation Care Providers       Provider Role Specialty Phone number    Ash Diaz MD Referring Internal Medicine [phone]            Refill Criteria    Visit with referring provider/group: Meets criteria: visit within referring provider group in the last 15 months on 1/2/25    [facility] referral last signed: 11/13/2024; within last year:  Yes    Lab monitoring is up to date (not exceeding 2 weeks overdue): Yes    Rajesh meets all criteria for refill. Rx instructions and quantity match patient's current dosing plan. Warfarin 90 day supply with 1 refill granted per [facility] protocol     Stacia Marrufo RN  Anticoagulation Clinic

## 2025-06-12 NOTE — TELEPHONE ENCOUNTER
Medication Question or Refill    Contacts       Contact Date/Time Type Contact Phone/Fax    06/12/2025 10:13 AM CDT Phone (Incoming) Rajesh Ordaz (Self) [phone] (M)     Anticoag            What medication are you calling about (include dose and sig)?: Warfarin 2.5 MG    Preferred Pharmacy:   [address] [address]  Phone: [phone] Fax: [phone]    [id number] - [place], [place] - [address] [place] [address] [address] [facility] [address] [address]  Phone: [phone] Fax: [phone]      Controlled Substance Agreement on file:   CSA -- Patient Level:    CSA: None found at the patient level.       Who prescribed the medication?: Dr. Diaz    Do you need a refill? Yes    When did you use the medication last? 6/11/25    Patient offered an appointment? No    Do you have any questions or concerns?  No      Could we send this information to you in [employer] or would you prefer to receive a phone call?:   Patient would prefer a phone call   Okay to leave a detailed message?: Yes at Cell number on file:    Telephone Information:   Mobile [phone]

## 2025-06-26 ENCOUNTER — ANTICOAGULATION THERAPY VISIT (OUTPATIENT)
Dept: ANTICOAGULATION | Facility: CLINIC | Age: 40
End: 2025-06-26

## 2025-06-26 ENCOUNTER — RESULTS FOLLOW-UP (OUTPATIENT)
Dept: ANTICOAGULATION | Facility: CLINIC | Age: 40
End: 2025-06-26

## 2025-06-26 ENCOUNTER — LAB (OUTPATIENT)
Dept: LAB | Facility: CLINIC | Age: 40
End: 2025-06-26
Payer: COMMERCIAL

## 2025-06-26 DIAGNOSIS — I26.99 PE (PULMONARY THROMBOEMBOLISM) (H): ICD-10-CM

## 2025-06-26 DIAGNOSIS — D68.51 HOMOZYGOUS FACTOR V LEIDEN MUTATION: ICD-10-CM

## 2025-06-26 DIAGNOSIS — Z79.01 WARFARIN ANTICOAGULATION: ICD-10-CM

## 2025-06-26 DIAGNOSIS — I26.99 PE (PULMONARY THROMBOEMBOLISM) (H): Primary | ICD-10-CM

## 2025-06-26 LAB — INR BLD: 2.6 (ref 0.9–1.1)

## 2025-06-26 NOTE — PROGRESS NOTES
ANTICOAGULATION MANAGEMENT     Rajesh Ordaz 40 year old male is on warfarin with therapeutic INR result. (Goal INR 2.0-3.0)    Recent labs: (last 7 days)     06/26/25  0845   INR 2.6*       ASSESSMENT     Source(s): Chart Review and Patient/Caregiver Call     Warfarin doses taken: Warfarin taken as instructed  Diet: No new diet changes identified.  Has resumed his usual diet.  Medication/supplement changes: None noted  New illness, injury, or hospitalization: No  Signs or symptoms of bleeding or clotting: No  Previous result: Supratherapeutic  Additional findings: None       PLAN     Recommended plan for no diet, medication or health factor changes affecting INR.      Recommended a  3 week check, but patient will be on vacation at that time so elected to follow up upon his return.     Dosing Instructions: Continue your current warfarin dose with next INR in 4 weeks       Summary  As of 6/26/2025      Full warfarin instructions:  2.5 mg every Mon, Wed, Fri; 3.75 mg all other days   Next INR check:  7/24/2025               Telephone call with Rajesh who agrees to plan and repeated back plan correctly    Lab visit scheduled    Education provided: Please call back if any changes to your diet, medications or how you've been taking warfarin    Plan made per [facility] anticoagulation protocol    Stacia Marrufo RN  6/26/2025  Anticoagulation Clinic  [employer] for routing messages: main [facility]  [facility] patient phone line: [phone]        _______________________________________________________________________     Anticoagulation Episode Summary       Current INR goal:  2.0-3.0   TTR:  77.9% (1 y)   Target end date:  Indefinite   Send INR reminders to:  [facility]    Indications    PE (pulmonary thromboembolism) (H) [I26.99]  Homozygous Factor V Leiden mutation [D68.51]  Warfarin anticoagulation [Z79.01]             Comments:  --             Anticoagulation Care Providers       Provider Role  Specialty Phone number    Ash Diaz MD Referring Internal Medicine [phone]

## 2025-07-24 ENCOUNTER — ANTICOAGULATION THERAPY VISIT (OUTPATIENT)
Dept: ANTICOAGULATION | Facility: CLINIC | Age: 40
End: 2025-07-24

## 2025-07-24 ENCOUNTER — LAB (OUTPATIENT)
Dept: LAB | Facility: CLINIC | Age: 40
End: 2025-07-24
Payer: COMMERCIAL

## 2025-07-24 DIAGNOSIS — Z79.01 WARFARIN ANTICOAGULATION: ICD-10-CM

## 2025-07-24 DIAGNOSIS — I26.99 PE (PULMONARY THROMBOEMBOLISM) (H): ICD-10-CM

## 2025-07-24 DIAGNOSIS — D68.51 HOMOZYGOUS FACTOR V LEIDEN MUTATION: ICD-10-CM

## 2025-07-24 DIAGNOSIS — I26.99 PE (PULMONARY THROMBOEMBOLISM) (H): Primary | ICD-10-CM

## 2025-07-24 LAB — INR BLD: 3 (ref 0.9–1.1)

## 2025-07-24 NOTE — PROGRESS NOTES
ANTICOAGULATION MANAGEMENT     Rajesh Ordaz 40 year old male is on warfarin with therapeutic INR result. (Goal INR 2.0-3.0)    Recent labs: (last 7 days)     07/24/25  0822   INR 3.0*       ASSESSMENT     Source(s): Chart Review  Previous INR was Therapeutic last visit; previously outside of goal range  Medication, diet, health changes since last INR: chart reviewed; none identified         PLAN     Recommended plan for no diet, medication or health factor changes affecting INR     Dosing Instructions: Continue your current warfarin dose with next INR in 5-6 weeks       Summary  As of 7/24/2025      Full warfarin instructions:  2.5 mg every Mon, Wed, Fri; 3.75 mg all other days   Next INR check:  9/4/2025               Detailed voice message left for Rajesh with dosing instructions and follow up date.     Contact [phone] to schedule and with any changes, questions or concerns.     Education provided: Please call back if any changes to your diet, medications or how you've been taking warfarin  Taking warfarin: Importance of taking warfarin as instructed    Plan made per [facility] anticoagulation protocol    Hermila Gore RN  7/24/2025  Anticoagulation Clinic  [employer] for routing messages: main [facility] patient phone line: [phone]        _______________________________________________________________________     Anticoagulation Episode Summary       Current INR goal:  2.0-3.0   TTR:  77.9% (1 y)   Target end date:  Indefinite   [facility]    Indications    PE (pulmonary thromboembolism) (H) [I26.99]  Homozygous Factor V Leiden mutation [D68.51]  Warfarin anticoagulation [Z79.01]             Comments:  --             Anticoagulation Care Providers       Provider Role Specialty Phone number    Ash Diaz MD Referring Internal Medicine [phone]

## 2025-09-03 DIAGNOSIS — Z30.2 ENCOUNTER FOR STERILIZATION: Primary | ICD-10-CM

## 2025-09-04 ENCOUNTER — ANTICOAGULATION THERAPY VISIT (OUTPATIENT)
Dept: ANTICOAGULATION | Facility: CLINIC | Age: 40
End: 2025-09-04

## 2025-09-04 ENCOUNTER — LAB (OUTPATIENT)
Dept: LAB | Facility: CLINIC | Age: 40
End: 2025-09-04
Payer: COMMERCIAL

## 2025-09-04 DIAGNOSIS — Z79.01 WARFARIN ANTICOAGULATION: ICD-10-CM

## 2025-09-04 DIAGNOSIS — D68.51 HOMOZYGOUS FACTOR V LEIDEN MUTATION: ICD-10-CM

## 2025-09-04 DIAGNOSIS — I26.99 PE (PULMONARY THROMBOEMBOLISM) (H): ICD-10-CM

## 2025-09-04 DIAGNOSIS — I26.99 PE (PULMONARY THROMBOEMBOLISM) (H): Primary | ICD-10-CM

## 2025-09-04 LAB — INR BLD: 2 (ref 0.9–1.1)
